# Patient Record
Sex: FEMALE | Race: WHITE | NOT HISPANIC OR LATINO | URBAN - METROPOLITAN AREA
[De-identification: names, ages, dates, MRNs, and addresses within clinical notes are randomized per-mention and may not be internally consistent; named-entity substitution may affect disease eponyms.]

---

## 2018-08-15 PROBLEM — Z00.00 ENCOUNTER FOR PREVENTIVE HEALTH EXAMINATION: Status: ACTIVE | Noted: 2018-08-15

## 2023-03-01 ENCOUNTER — EMERGENCY (EMERGENCY)
Facility: HOSPITAL | Age: 53
LOS: 1 days | Discharge: HOME | End: 2023-03-01
Attending: EMERGENCY MEDICINE | Admitting: EMERGENCY MEDICINE
Payer: COMMERCIAL

## 2023-03-01 VITALS
RESPIRATION RATE: 16 BRPM | OXYGEN SATURATION: 98 % | SYSTOLIC BLOOD PRESSURE: 170 MMHG | TEMPERATURE: 98 F | DIASTOLIC BLOOD PRESSURE: 80 MMHG | HEART RATE: 82 BPM

## 2023-03-01 VITALS
WEIGHT: 110.01 LBS | OXYGEN SATURATION: 98 % | SYSTOLIC BLOOD PRESSURE: 183 MMHG | TEMPERATURE: 97 F | DIASTOLIC BLOOD PRESSURE: 101 MMHG | HEART RATE: 79 BPM | RESPIRATION RATE: 18 BRPM

## 2023-03-01 LAB
ALBUMIN SERPL ELPH-MCNC: 3.8 G/DL — SIGNIFICANT CHANGE UP (ref 3.3–5)
ALP SERPL-CCNC: 132 U/L — HIGH (ref 40–120)
ALT FLD-CCNC: 120 U/L — HIGH (ref 12–78)
ANION GAP SERPL CALC-SCNC: 19 MMOL/L — HIGH (ref 5–17)
AST SERPL-CCNC: 173 U/L — HIGH (ref 15–37)
BASOPHILS # BLD AUTO: 0.04 K/UL — SIGNIFICANT CHANGE UP (ref 0–0.2)
BASOPHILS NFR BLD AUTO: 0.4 % — SIGNIFICANT CHANGE UP (ref 0–2)
BILIRUB SERPL-MCNC: 1.4 MG/DL — HIGH (ref 0.2–1.2)
BUN SERPL-MCNC: 11 MG/DL — SIGNIFICANT CHANGE UP (ref 7–23)
CALCIUM SERPL-MCNC: 8.9 MG/DL — SIGNIFICANT CHANGE UP (ref 8.5–10.1)
CHLORIDE SERPL-SCNC: 88 MMOL/L — LOW (ref 96–108)
CO2 SERPL-SCNC: 26 MMOL/L — SIGNIFICANT CHANGE UP (ref 22–31)
CREAT SERPL-MCNC: 0.59 MG/DL — SIGNIFICANT CHANGE UP (ref 0.5–1.3)
EGFR: 107 ML/MIN/1.73M2 — SIGNIFICANT CHANGE UP
EOSINOPHIL # BLD AUTO: 0.01 K/UL — SIGNIFICANT CHANGE UP (ref 0–0.5)
EOSINOPHIL NFR BLD AUTO: 0.1 % — SIGNIFICANT CHANGE UP (ref 0–6)
ETHANOL SERPL-MCNC: <10 MG/DL — SIGNIFICANT CHANGE UP (ref 0–10)
GLUCOSE SERPL-MCNC: 101 MG/DL — HIGH (ref 70–99)
HCT VFR BLD CALC: 42.6 % — SIGNIFICANT CHANGE UP (ref 34.5–45)
HGB BLD-MCNC: 15.4 G/DL — SIGNIFICANT CHANGE UP (ref 11.5–15.5)
IMM GRANULOCYTES NFR BLD AUTO: 0.5 % — SIGNIFICANT CHANGE UP (ref 0–0.9)
INR BLD: 1.03 RATIO — SIGNIFICANT CHANGE UP (ref 0.88–1.16)
LIDOCAIN IGE QN: 180 U/L — SIGNIFICANT CHANGE UP (ref 73–393)
LYMPHOCYTES # BLD AUTO: 1.47 K/UL — SIGNIFICANT CHANGE UP (ref 1–3.3)
LYMPHOCYTES # BLD AUTO: 13.5 % — SIGNIFICANT CHANGE UP (ref 13–44)
MCHC RBC-ENTMCNC: 31.3 PG — SIGNIFICANT CHANGE UP (ref 27–34)
MCHC RBC-ENTMCNC: 36.2 GM/DL — HIGH (ref 32–36)
MCV RBC AUTO: 86.6 FL — SIGNIFICANT CHANGE UP (ref 80–100)
MONOCYTES # BLD AUTO: 0.85 K/UL — SIGNIFICANT CHANGE UP (ref 0–0.9)
MONOCYTES NFR BLD AUTO: 7.8 % — SIGNIFICANT CHANGE UP (ref 2–14)
NEUTROPHILS # BLD AUTO: 8.49 K/UL — HIGH (ref 1.8–7.4)
NEUTROPHILS NFR BLD AUTO: 77.7 % — HIGH (ref 43–77)
NRBC # BLD: 0 /100 WBCS — SIGNIFICANT CHANGE UP (ref 0–0)
PLATELET # BLD AUTO: 257 K/UL — SIGNIFICANT CHANGE UP (ref 150–400)
POTASSIUM SERPL-MCNC: 3 MMOL/L — LOW (ref 3.5–5.3)
POTASSIUM SERPL-SCNC: 3 MMOL/L — LOW (ref 3.5–5.3)
PROT SERPL-MCNC: 8.1 G/DL — SIGNIFICANT CHANGE UP (ref 6–8.3)
PROTHROM AB SERPL-ACNC: 12.1 SEC — SIGNIFICANT CHANGE UP (ref 10.5–13.4)
RBC # BLD: 4.92 M/UL — SIGNIFICANT CHANGE UP (ref 3.8–5.2)
RBC # FLD: 12.2 % — SIGNIFICANT CHANGE UP (ref 10.3–14.5)
SODIUM SERPL-SCNC: 133 MMOL/L — LOW (ref 135–145)
WBC # BLD: 10.91 K/UL — HIGH (ref 3.8–10.5)
WBC # FLD AUTO: 10.91 K/UL — HIGH (ref 3.8–10.5)

## 2023-03-01 PROCEDURE — 80307 DRUG TEST PRSMV CHEM ANLYZR: CPT

## 2023-03-01 PROCEDURE — 80053 COMPREHEN METABOLIC PANEL: CPT

## 2023-03-01 PROCEDURE — 96376 TX/PRO/DX INJ SAME DRUG ADON: CPT

## 2023-03-01 PROCEDURE — 76705 ECHO EXAM OF ABDOMEN: CPT

## 2023-03-01 PROCEDURE — 36415 COLL VENOUS BLD VENIPUNCTURE: CPT

## 2023-03-01 PROCEDURE — 99285 EMERGENCY DEPT VISIT HI MDM: CPT

## 2023-03-01 PROCEDURE — 96374 THER/PROPH/DIAG INJ IV PUSH: CPT

## 2023-03-01 PROCEDURE — 99285 EMERGENCY DEPT VISIT HI MDM: CPT | Mod: 25

## 2023-03-01 PROCEDURE — 93010 ELECTROCARDIOGRAM REPORT: CPT

## 2023-03-01 PROCEDURE — 96375 TX/PRO/DX INJ NEW DRUG ADDON: CPT

## 2023-03-01 PROCEDURE — 85610 PROTHROMBIN TIME: CPT

## 2023-03-01 PROCEDURE — 76705 ECHO EXAM OF ABDOMEN: CPT | Mod: 26

## 2023-03-01 PROCEDURE — 85025 COMPLETE CBC W/AUTO DIFF WBC: CPT

## 2023-03-01 PROCEDURE — 93005 ELECTROCARDIOGRAM TRACING: CPT

## 2023-03-01 PROCEDURE — 83690 ASSAY OF LIPASE: CPT

## 2023-03-01 RX ORDER — ONDANSETRON 8 MG/1
4 TABLET, FILM COATED ORAL ONCE
Refills: 0 | Status: COMPLETED | OUTPATIENT
Start: 2023-03-01 | End: 2023-03-01

## 2023-03-01 RX ORDER — POTASSIUM CHLORIDE 20 MEQ
10 PACKET (EA) ORAL ONCE
Refills: 0 | Status: DISCONTINUED | OUTPATIENT
Start: 2023-03-01 | End: 2023-03-01

## 2023-03-01 RX ORDER — SODIUM CHLORIDE 9 MG/ML
1000 INJECTION INTRAMUSCULAR; INTRAVENOUS; SUBCUTANEOUS ONCE
Refills: 0 | Status: COMPLETED | OUTPATIENT
Start: 2023-03-01 | End: 2023-03-01

## 2023-03-01 RX ORDER — POTASSIUM CHLORIDE 20 MEQ
10 PACKET (EA) ORAL
Refills: 0 | Status: DISCONTINUED | OUTPATIENT
Start: 2023-03-01 | End: 2023-03-04

## 2023-03-01 RX ORDER — POTASSIUM CHLORIDE 20 MEQ
40 PACKET (EA) ORAL ONCE
Refills: 0 | Status: COMPLETED | OUTPATIENT
Start: 2023-03-01 | End: 2023-03-01

## 2023-03-01 RX ADMIN — SODIUM CHLORIDE 1000 MILLILITER(S): 9 INJECTION INTRAMUSCULAR; INTRAVENOUS; SUBCUTANEOUS at 11:10

## 2023-03-01 RX ADMIN — Medication 1 MILLIGRAM(S): at 11:10

## 2023-03-01 RX ADMIN — ONDANSETRON 4 MILLIGRAM(S): 8 TABLET, FILM COATED ORAL at 13:38

## 2023-03-01 RX ADMIN — Medication 100 MILLIEQUIVALENT(S): at 13:38

## 2023-03-01 RX ADMIN — ONDANSETRON 4 MILLIGRAM(S): 8 TABLET, FILM COATED ORAL at 11:10

## 2023-03-01 RX ADMIN — Medication 1 MILLIGRAM(S): at 13:39

## 2023-03-01 RX ADMIN — Medication 40 MILLIEQUIVALENT(S): at 13:37

## 2023-03-01 NOTE — ED PROVIDER NOTE - NSFOLLOWUPINSTRUCTIONS_ED_ALL_ED_FT
-- You should update your primary care physician on your Emergency Department visit and follow up with them.  If you do not have a physician or have difficulty following up, please call: [phone] to obtain a [facility] doctor or specialist who can provide follow up.    -- Return to the ER for worsening or persistent symptoms, and/or ANY NEW OR CONCERNING SYMPTOMS.    -- Take Librium as prescribed    -- Follow up with your primary care doctor and Dr. Manuel Hogue (substance abuse specialist)

## 2023-03-01 NOTE — ED PROVIDER NOTE - OBJECTIVE STATEMENT
pt states that she has n/v/d now for the last 2 days and feeling very shaky, pt states that about 10 days ago she stopped taking her xanax and stopped drinking alcohol. pt wants to stop both and was not sure how to do it so she just stopped "cold turkey".

## 2023-03-01 NOTE — ED PROVIDER NOTE - CLINICAL SUMMARY MEDICAL DECISION MAKING FREE TEXT BOX
Justin: pt with benzo/alcohol withdrawal will start on librium taper and have pt follow up with substance abuse specialist

## 2023-03-01 NOTE — ED PROVIDER NOTE - CARE PROVIDER_API CALL
Manuel Hogue)  Internal Medicine  [address] [place] [address]  Phone: [phone]  Fax: [phone]  Follow Up Time:

## 2023-03-01 NOTE — CONSULT NOTE ADULT - ASSESSMENT
nausea/vomiting  abdominal pain  transaminitis  EtOH dependence    US noted  LFTs noted  Anti-emetics prn  Pain control  Pt admits to EtOH dependence  Follow up blood alcohol  May need admission for detox; pending labs  Will follow    I reviewed the overnight course of events on the unit, re-confirming the patient history. I discussed the care with the patient and their family  Differential diagnosis and plan of care discussed with patient after the evaluation  35 minutes spent on total encounter of which more than fifty percent of the encounter was spent counseling and/or coordinating care by the attending physician.  Advanced care planning was discussed with patient and family.  Advanced care planning forms were reviewed and discussed.  Risks, benefits and alternatives of gastroenterologic procedures were discussed in detail and all questions were answered. nausea/vomiting  abdominal pain  transaminitis  EtOH dependence    US noted  LFTs noted  Anti-emetics prn  Pain control  Pt admits to EtOH dependence  Follow up blood alcohol  Monitor for withdrawal  May need admission for detox; pending labs  Will follow    I reviewed the overnight course of events on the unit, re-confirming the patient history. I discussed the care with the patient and their family  Differential diagnosis and plan of care discussed with patient after the evaluation  35 minutes spent on total encounter of which more than fifty percent of the encounter was spent counseling and/or coordinating care by the attending physician.  Advanced care planning was discussed with patient and family.  Advanced care planning forms were reviewed and discussed.  Risks, benefits and alternatives of gastroenterologic procedures were discussed in detail and all questions were answered. nausea/vomiting  abdominal pain  transaminitis  EtOH dependence    US noted  LFTs noted  Anti-emetics prn  Pain control  Pt admits to EtOH dependence  suspected alcoholic fatty liver disease  etoh cessation discussed  repeat lfts with pcp  d/w patient and family    I reviewed the overnight course of events on the unit, re-confirming the patient history. I discussed the care with the patient and their family  Differential diagnosis and plan of care discussed with patient after the evaluation  35 minutes spent on total encounter of which more than fifty percent of the encounter was spent counseling and/or coordinating care by the attending physician.  Advanced care planning was discussed with patient and family.  Advanced care planning forms were reviewed and discussed.  Risks, benefits and alternatives of gastroenterologic procedures were discussed in detail and all questions were answered.

## 2023-03-01 NOTE — ED PROVIDER NOTE - CARE PLAN
Referral made to [facility] for 1st time breast feeding mother.    1 Principal Discharge DX:	Symptom of drug withdrawal

## 2023-03-01 NOTE — ED ADULT NURSE NOTE - NSIMPLEMENTINTERV_GEN_ALL_ED
Left message to call back.    Implemented All Universal Safety Interventions:  [place] to call system. Call bell, personal items and telephone within reach. Instruct patient to call for assistance. Room bathroom lighting operational. Non-slip footwear when patient is off stretcher. Physically safe environment: no spills, clutter or unnecessary equipment. Stretcher in lowest position, wheels locked, appropriate side rails in place.

## 2023-03-01 NOTE — ED PROVIDER NOTE - CARE PROVIDERS DIRECT ADDRESSES
[email or web address]

## 2023-03-01 NOTE — CONSULT NOTE ADULT - SUBJECTIVE AND OBJECTIVE BOX
[facility]  Alexandro Parra PA-C  [address] [place] [id number] [phone]      Chief Complaint:  Patient is a 52y old  Female who presents with a chief complaint of nausea/vomiting/diarrhea    HPI:  51 yo pt seen in emergency department with daughter at bedside. She reports PMHx of EtOH dependence. Pt's daughter states pt has been having nausea/vomiting/diarrhea on and off for several months. They state pt previously was dependent on EtOH, and daughter states pt has recently relapsed. Pt claims she currently drinks a pint of vodka about 4 times per week, and she states last alcoholic drink was last Friday. Per ER staff pt is on xanax but stopped it abruptly. Pt currently still has nausea/vomiting/diarrhea, and reports she noticed some dark colored vomiting and diarrhea. Otherwise denies further GI complaints.    Allergies:  No Known Allergies      Medications:  potassium chloride  10 mEq/100 mL IVPB 10 milliEquivalent(s) IV Intermittent every 1 hour      PMHX/PSHX:    EtOH dependence    Social History:   Pt states currently drinks a pint of vodka 4 times per week    ROS:   General:  No fevers, chills, night sweats, fatigue,   Eyes:  Good vision, no reported pain  ENT:  No sore throat, pain, runny nose, dysphagia  CV:  No pain, palpitations, hypo/hypertension  Resp:  No dyspnea, cough, tachypnea, wheezing  GI:  +pain, +nausea, +vomiting, +diarrhea, No constipation, No weight loss, No fever, No pruritis, No rectal bleeding, No tarry stools, No dysphagia,  :  No pain, bleeding, incontinence, nocturia  Muscle:  No pain, weakness  Neuro:  No weakness, tingling, memory problems  Psych:  No fatigue, insomnia, mood problems, depression  Endocrine:  No polyuria, polydipsia, cold/heat intolerance  Heme:  No petechiae, ecchymosis, easy bruisability  Skin:  No rash, tattoos, scars, edema      PHYSICAL EXAM:   Vital Signs:  Vital Signs Last 24 Hrs  T(C): 36.3 (01 Mar 2023 10:00), Max: 36.3 (01 Mar 2023 10:00)  T(F): 97.3 (01 Mar 2023 10:00), Max: 97.3 (01 Mar 2023 10:00)  HR: 85 (01 Mar 2023 13:25) (79 - 85)  BP: 171/98 (01 Mar 2023 13:25) (171/98 - 183/101)  BP(mean): --  RR: 18 (01 Mar 2023 13:25) (18 - 18)  SpO2: 99% (01 Mar 2023 13:25) (98% - 99%)    Parameters below as of 01 Mar 2023 10:00  Patient On (Oxygen Delivery Method): room air    GENERAL:  Appears stated age  HEENT:  NC/AT  CHEST:  Full & symmetric excursion  HEART:  Regular rhythm  ABDOMEN:  Soft, +diffusely tender, non-distended  EXTREMITIES:  No cyanosis, clubbing or edema  SKIN:  No rash  NEURO:  Alert    LABS:                        15.4   10.91 )-----------( 257      ( 01 Mar 2023 11:00 )             42.6     03-01    133<L>  |  88<L>  |  11  ----------------------------<  101<H>  3.0<L>   |  26  |  0.59    Ca    8.9      01 Mar 2023 11:00    TPro  8.1  /  Alb  3.8  /  TBili  1.4<H>  /  DBili  x   /  AST  173<H>  /  ALT  120<H>  /  AlkPhos  132<H>  03-01    LIVER FUNCTIONS - ( 01 Mar 2023 11:00 )  Alb: 3.8 g/dL / Pro: 8.1 g/dL / ALK PHOS: 132 U/L / ALT: 120 U/L / AST: 173 U/L / GGT: x               Lipase [id number]         Imaging:  < from: US Abdomen Upper Quadrant Right (03.01.23 @ 13:06) >    ACC: [id number] EXAM:  US ABDOMEN RT UPR QUADRANT   ORDERED BY: XU MILAN     PROCEDURE DATE:  03/01/2023          INTERPRETATION:  CLINICAL INFORMATION: Right upper quadrant abdominal   pain.    COMPARISON: None available.    TECHNIQUE: Sonography of the right upper quadrant.    FINDINGS:  Liver: Increased echogenicity.  Bile ducts: Normal caliber. Common bile duct measures 4 mm.  Gallbladder: Within normal limits. No cholelithiasis, wall thickening or   pericholecystic fluid.  Pancreas: Visualized portions are within normal limits.  Right kidney: 10.1 cm. No hydronephrosis.  Ascites: None.  IVC: Visualized portions are within normal limits.    IMPRESSION:  Hepatic steatosis.    --- End of Report ---      GENE BUTLER MD; Attending Radiologist  This document has been electronically signed. Mar  1 2023  1:15PM    < end of copied text >

## 2023-03-01 NOTE — ED PROVIDER NOTE - PATIENT PORTAL LINK FT
You can access the FollowMyHealth Patient Portal offered by [facility] by registering at the following website: [email or web address]. By joining [facility]’s FollowMyHealth portal, you will also be able to view your health information using other applications (apps) compatible with our system.

## 2023-03-08 DIAGNOSIS — F32.A DEPRESSION, UNSPECIFIED: ICD-10-CM

## 2023-03-08 DIAGNOSIS — Z78.9 OTHER SPECIFIED HEALTH STATUS: ICD-10-CM

## 2023-03-08 DIAGNOSIS — Z82.3 FAMILY HISTORY OF STROKE: ICD-10-CM

## 2023-03-08 DIAGNOSIS — F17.210 NICOTINE DEPENDENCE, CIGARETTES, UNCOMPLICATED: ICD-10-CM

## 2023-03-08 DIAGNOSIS — Z82.49 FAMILY HISTORY OF ISCHEMIC HEART DISEASE AND OTHER DISEASES OF THE CIRCULATORY SYSTEM: ICD-10-CM

## 2023-03-08 DIAGNOSIS — F41.9 ANXIETY DISORDER, UNSPECIFIED: ICD-10-CM

## 2023-03-08 DIAGNOSIS — Z80.8 FAMILY HISTORY OF MALIGNANT NEOPLASM OF OTHER ORGANS OR SYSTEMS: ICD-10-CM

## 2023-03-08 RX ORDER — ARIPIPRAZOLE 2 MG/1
2 TABLET ORAL
Refills: 0 | Status: ACTIVE | COMMUNITY

## 2023-03-09 ENCOUNTER — NON-APPOINTMENT (OUTPATIENT)
Age: 53
End: 2023-03-09

## 2023-03-09 ENCOUNTER — APPOINTMENT (OUTPATIENT)
Dept: CARDIOLOGY | Facility: CLINIC | Age: 53
End: 2023-03-09
Payer: COMMERCIAL

## 2023-03-09 VITALS
BODY MASS INDEX: 24.77 KG/M2 | HEIGHT: 58 IN | OXYGEN SATURATION: 98 % | SYSTOLIC BLOOD PRESSURE: 160 MMHG | WEIGHT: 118 LBS | HEART RATE: 79 BPM | DIASTOLIC BLOOD PRESSURE: 100 MMHG

## 2023-03-09 DIAGNOSIS — R55 SYNCOPE AND COLLAPSE: ICD-10-CM

## 2023-03-09 DIAGNOSIS — R07.89 OTHER CHEST PAIN: ICD-10-CM

## 2023-03-09 DIAGNOSIS — I10 ESSENTIAL (PRIMARY) HYPERTENSION: ICD-10-CM

## 2023-03-09 PROCEDURE — 93000 ELECTROCARDIOGRAM COMPLETE: CPT

## 2023-03-09 PROCEDURE — 99244 OFF/OP CNSLTJ NEW/EST MOD 40: CPT

## 2023-03-09 RX ORDER — SERTRALINE HYDROCHLORIDE 100 MG/1
100 TABLET, FILM COATED ORAL DAILY
Qty: 180 | Refills: 3 | Status: ACTIVE | COMMUNITY

## 2023-03-09 RX ORDER — ALPRAZOLAM 2 MG/1
TABLET ORAL
Refills: 0 | Status: ACTIVE | COMMUNITY

## 2023-03-09 NOTE — HISTORY OF PRESENT ILLNESS
[FreeTextEntry1] : Felicia presented to the office today for a cardiovascular evaluation.  She presents for the further evaluation of syncope and chest discomfort.\par \par She is now 52 years old, with a history of hypertension.  She does not have diabetes.  In the past, she was on cholesterol medication, but reportedly her cholesterol improved and she is no longer taking medication for that.  She has a history of smoking, at times up to 2 packs/day, presently one half a pack a day.  She is not known to have any underlying structural heart disease.  She does have a history of breast cancer status postresection, following which she had chemotherapy and 5 years of tamoxifen.  There is a family history of aggressive atherosclerosis, noting that her father had a myocardial infarction at the age of 42, with 2 subsequent open heart surgeries.  Her brother had a myocardial infarction at a young age, and presently has a defibrillator.\par \par At baseline, she does not exercise, but tries to stay physically active.  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  She does report chest discomfort which seems to be quite brief, and without definite connection to physical activity or emotional stress.  She describes an occasional left-sided chest discomfort, and an occasional midsternal discomfort without reproducible radiation or associated symptoms.  This is a very brief discomfort, lasting moments.  She denies orthopnea, PND and lower extremity edema.  She denies palpitations, dizziness and prior episodes of syncope.\par \par She has been in her usual state of health generally speaking until recently.  She had significant nausea vomiting and dehydration given some confusion with her medications.  She says that she had minimal p.o. intake for about 1 week.  In that context, she reports that on a single day recently, she got up out of bed or from a chair several times, felt lightheaded, and fell to the floor with brief losses of consciousness.  These were not preceded by any sensation of palpitations, and there was no prolonged postictal state.  It sounds like this was all in the context of her having stopped her Xanax, with some associated withdrawal symptoms.  She was recently evaluated by her primary care physician, and given concerns about her overall risk factors and these episodes of syncope, she was referred here for evaluation.

## 2023-03-09 NOTE — DISCUSSION/SUMMARY
[FreeTextEntry1] : Her blood pressure is elevated, and does not meaningfully improve by the conclusion of the examination.  She has multiple risk factors for atherosclerosis, presents with multiple syncopal episodes in the setting of dehydration.  As well as chest discomfort of uncertain etiology.\par \par For her syncope, which was likely related to orthostasis in the setting of dehydration, I have suggested a precautionary echocardiogram.  Noting her blood pressure, I have requested that she start checking her blood pressure at home twice daily.  She will come to the office in about 2 weeks for a blood pressure check with our nurses, with additional medications changed or added on an as-needed basis at that time.  When her blood pressure is sufficiently controlled, I have suggested exercise nuclear stress testing.  I will be in contact with her to discuss the results.  To make sure that her blood pressure and her overall risk factors are better controlled, I have suggested a follow-up in about 3 months.  I have requested her most recent blood work results for my review, including her lipid profile.

## 2023-03-14 RX ORDER — LOSARTAN POTASSIUM 100 MG/1
TABLET, FILM COATED ORAL
Refills: 0 | Status: DISCONTINUED | COMMUNITY
End: 2023-03-14

## 2023-03-14 RX ORDER — VALSARTAN 160 MG/1
160 TABLET, COATED ORAL
Refills: 0 | Status: ACTIVE | COMMUNITY

## 2023-03-14 RX ORDER — METOPROLOL SUCCINATE 100 MG/1
100 TABLET, EXTENDED RELEASE ORAL
Refills: 0 | Status: ACTIVE | COMMUNITY

## 2023-04-03 ENCOUNTER — APPOINTMENT (OUTPATIENT)
Dept: CARDIOLOGY | Facility: CLINIC | Age: 53
End: 2023-04-03

## 2023-05-25 ENCOUNTER — INPATIENT (INPATIENT)
Facility: HOSPITAL | Age: 53
LOS: 1 days | Discharge: HOME | DRG: 439 | End: 2023-05-27
Attending: FAMILY MEDICINE | Admitting: INTERNAL MEDICINE
Payer: COMMERCIAL

## 2023-05-25 VITALS
RESPIRATION RATE: 19 BRPM | DIASTOLIC BLOOD PRESSURE: 59 MMHG | HEIGHT: 56 IN | OXYGEN SATURATION: 99 % | SYSTOLIC BLOOD PRESSURE: 89 MMHG | TEMPERATURE: 98 F | WEIGHT: 104.94 LBS | HEART RATE: 75 BPM

## 2023-05-25 DIAGNOSIS — I95.9 HYPOTENSION, UNSPECIFIED: ICD-10-CM

## 2023-05-25 DIAGNOSIS — F41.9 ANXIETY DISORDER, UNSPECIFIED: ICD-10-CM

## 2023-05-25 DIAGNOSIS — Z29.9 ENCOUNTER FOR PROPHYLACTIC MEASURES, UNSPECIFIED: ICD-10-CM

## 2023-05-25 DIAGNOSIS — K85.90 ACUTE PANCREATITIS WITHOUT NECROSIS OR INFECTION, UNSPECIFIED: ICD-10-CM

## 2023-05-25 DIAGNOSIS — E87.6 HYPOKALEMIA: ICD-10-CM

## 2023-05-25 DIAGNOSIS — E86.0 DEHYDRATION: ICD-10-CM

## 2023-05-25 DIAGNOSIS — E87.8 OTHER DISORDERS OF ELECTROLYTE AND FLUID BALANCE, NOT ELSEWHERE CLASSIFIED: ICD-10-CM

## 2023-05-25 DIAGNOSIS — R94.31 ABNORMAL ELECTROCARDIOGRAM [ECG] [EKG]: ICD-10-CM

## 2023-05-25 DIAGNOSIS — F10.20 ALCOHOL DEPENDENCE, UNCOMPLICATED: ICD-10-CM

## 2023-05-25 DIAGNOSIS — N17.9 ACUTE KIDNEY FAILURE, UNSPECIFIED: ICD-10-CM

## 2023-05-25 LAB
ALBUMIN SERPL ELPH-MCNC: 3.4 G/DL — SIGNIFICANT CHANGE UP (ref 3.3–5)
ALP SERPL-CCNC: 88 U/L — SIGNIFICANT CHANGE UP (ref 40–120)
ALT FLD-CCNC: 56 U/L — SIGNIFICANT CHANGE UP (ref 12–78)
AMYLASE P1 CFR SERPL: 228 U/L — HIGH (ref 25–125)
ANION GAP SERPL CALC-SCNC: 12 MMOL/L — SIGNIFICANT CHANGE UP (ref 5–17)
APPEARANCE UR: CLEAR — SIGNIFICANT CHANGE UP
AST SERPL-CCNC: 44 U/L — HIGH (ref 15–37)
BASOPHILS # BLD AUTO: 0.03 K/UL — SIGNIFICANT CHANGE UP (ref 0–0.2)
BASOPHILS NFR BLD AUTO: 0.3 % — SIGNIFICANT CHANGE UP (ref 0–2)
BILIRUB SERPL-MCNC: 0.5 MG/DL — SIGNIFICANT CHANGE UP (ref 0.2–1.2)
BILIRUB UR-MCNC: NEGATIVE — SIGNIFICANT CHANGE UP
BUN SERPL-MCNC: 51 MG/DL — HIGH (ref 7–23)
CALCIUM SERPL-MCNC: 9.2 MG/DL — SIGNIFICANT CHANGE UP (ref 8.5–10.1)
CHLORIDE SERPL-SCNC: 87 MMOL/L — LOW (ref 96–108)
CO2 SERPL-SCNC: 32 MMOL/L — HIGH (ref 22–31)
COLOR SPEC: SIGNIFICANT CHANGE UP
CREAT SERPL-MCNC: 1.4 MG/DL — HIGH (ref 0.5–1.3)
DIFF PNL FLD: NEGATIVE — SIGNIFICANT CHANGE UP
EGFR: 45 ML/MIN/1.73M2 — LOW
EOSINOPHIL # BLD AUTO: 0.07 K/UL — SIGNIFICANT CHANGE UP (ref 0–0.5)
EOSINOPHIL NFR BLD AUTO: 0.6 % — SIGNIFICANT CHANGE UP (ref 0–6)
EPI CELLS # UR: SIGNIFICANT CHANGE UP
ETHANOL SERPL-MCNC: <10 MG/DL — SIGNIFICANT CHANGE UP (ref 0–10)
FLUAV AG NPH QL: SIGNIFICANT CHANGE UP
FLUBV AG NPH QL: SIGNIFICANT CHANGE UP
GLUCOSE SERPL-MCNC: 97 MG/DL — SIGNIFICANT CHANGE UP (ref 70–99)
GLUCOSE UR QL: 50 MG/DL
HCT VFR BLD CALC: 38.4 % — SIGNIFICANT CHANGE UP (ref 34.5–45)
HGB BLD-MCNC: 13.9 G/DL — SIGNIFICANT CHANGE UP (ref 11.5–15.5)
IMM GRANULOCYTES NFR BLD AUTO: 0.6 % — SIGNIFICANT CHANGE UP (ref 0–0.9)
KETONES UR-MCNC: NEGATIVE — SIGNIFICANT CHANGE UP
LEUKOCYTE ESTERASE UR-ACNC: ABNORMAL
LIDOCAIN IGE QN: 3190 U/L — HIGH (ref 73–393)
LYMPHOCYTES # BLD AUTO: 2.47 K/UL — SIGNIFICANT CHANGE UP (ref 1–3.3)
LYMPHOCYTES # BLD AUTO: 21.2 % — SIGNIFICANT CHANGE UP (ref 13–44)
MAGNESIUM SERPL-MCNC: 1.8 MG/DL — SIGNIFICANT CHANGE UP (ref 1.6–2.6)
MCHC RBC-ENTMCNC: 32.6 PG — SIGNIFICANT CHANGE UP (ref 27–34)
MCHC RBC-ENTMCNC: 36.2 GM/DL — HIGH (ref 32–36)
MCV RBC AUTO: 90.1 FL — SIGNIFICANT CHANGE UP (ref 80–100)
MONOCYTES # BLD AUTO: 1.07 K/UL — HIGH (ref 0–0.9)
MONOCYTES NFR BLD AUTO: 9.2 % — SIGNIFICANT CHANGE UP (ref 2–14)
NEUTROPHILS # BLD AUTO: 7.92 K/UL — HIGH (ref 1.8–7.4)
NEUTROPHILS NFR BLD AUTO: 68.1 % — SIGNIFICANT CHANGE UP (ref 43–77)
NITRITE UR-MCNC: NEGATIVE — SIGNIFICANT CHANGE UP
NRBC # BLD: 0 /100 WBCS — SIGNIFICANT CHANGE UP (ref 0–0)
PH UR: 7 — SIGNIFICANT CHANGE UP (ref 5–8)
PLATELET # BLD AUTO: 237 K/UL — SIGNIFICANT CHANGE UP (ref 150–400)
POTASSIUM SERPL-MCNC: 2.3 MMOL/L — CRITICAL LOW (ref 3.5–5.3)
POTASSIUM SERPL-SCNC: 2.3 MMOL/L — CRITICAL LOW (ref 3.5–5.3)
PROT SERPL-MCNC: 6.7 G/DL — SIGNIFICANT CHANGE UP (ref 6–8.3)
PROT UR-MCNC: NEGATIVE — SIGNIFICANT CHANGE UP
RBC # BLD: 4.26 M/UL — SIGNIFICANT CHANGE UP (ref 3.8–5.2)
RBC # FLD: 11.7 % — SIGNIFICANT CHANGE UP (ref 10.3–14.5)
RBC CASTS # UR COMP ASSIST: SIGNIFICANT CHANGE UP /HPF (ref 0–4)
RSV RNA NPH QL NAA+NON-PROBE: SIGNIFICANT CHANGE UP
SARS-COV-2 RNA SPEC QL NAA+PROBE: SIGNIFICANT CHANGE UP
SODIUM SERPL-SCNC: 131 MMOL/L — LOW (ref 135–145)
SP GR SPEC: 1 — LOW (ref 1.01–1.02)
TROPONIN I, HIGH SENSITIVITY RESULT: 30.6 NG/L — SIGNIFICANT CHANGE UP
UROBILINOGEN FLD QL: NEGATIVE — SIGNIFICANT CHANGE UP
WBC # BLD: 11.63 K/UL — HIGH (ref 3.8–10.5)
WBC # FLD AUTO: 11.63 K/UL — HIGH (ref 3.8–10.5)
WBC UR QL: SIGNIFICANT CHANGE UP

## 2023-05-25 PROCEDURE — 74177 CT ABD & PELVIS W/CONTRAST: CPT | Mod: 26

## 2023-05-25 PROCEDURE — 71045 X-RAY EXAM CHEST 1 VIEW: CPT | Mod: 26

## 2023-05-25 PROCEDURE — 99285 EMERGENCY DEPT VISIT HI MDM: CPT

## 2023-05-25 PROCEDURE — 93010 ELECTROCARDIOGRAM REPORT: CPT

## 2023-05-25 RX ORDER — ALPRAZOLAM 0.25 MG
1 TABLET ORAL
Refills: 0 | DISCHARGE

## 2023-05-25 RX ORDER — ARIPIPRAZOLE 15 MG/1
5 TABLET ORAL DAILY
Refills: 0 | Status: DISCONTINUED | OUTPATIENT
Start: 2023-05-25 | End: 2023-05-27

## 2023-05-25 RX ORDER — SERTRALINE 25 MG/1
100 TABLET, FILM COATED ORAL
Refills: 0 | Status: DISCONTINUED | OUTPATIENT
Start: 2023-05-25 | End: 2023-05-27

## 2023-05-25 RX ORDER — ARIPIPRAZOLE 15 MG/1
1 TABLET ORAL
Refills: 0 | DISCHARGE

## 2023-05-25 RX ORDER — POTASSIUM CHLORIDE 20 MEQ
40 PACKET (EA) ORAL ONCE
Refills: 0 | Status: COMPLETED | OUTPATIENT
Start: 2023-05-25 | End: 2023-05-25

## 2023-05-25 RX ORDER — METOPROLOL TARTRATE 50 MG
1 TABLET ORAL
Refills: 0 | DISCHARGE

## 2023-05-25 RX ORDER — POTASSIUM CHLORIDE 20 MEQ
10 PACKET (EA) ORAL
Refills: 0 | Status: COMPLETED | OUTPATIENT
Start: 2023-05-25 | End: 2023-05-25

## 2023-05-25 RX ORDER — HEPARIN SODIUM 5000 [USP'U]/ML
5000 INJECTION INTRAVENOUS; SUBCUTANEOUS EVERY 8 HOURS
Refills: 0 | Status: DISCONTINUED | OUTPATIENT
Start: 2023-05-25 | End: 2023-05-26

## 2023-05-25 RX ORDER — SODIUM CHLORIDE 9 MG/ML
2000 INJECTION INTRAMUSCULAR; INTRAVENOUS; SUBCUTANEOUS ONCE
Refills: 0 | Status: COMPLETED | OUTPATIENT
Start: 2023-05-25 | End: 2023-05-25

## 2023-05-25 RX ORDER — SODIUM CHLORIDE 9 MG/ML
1000 INJECTION, SOLUTION INTRAVENOUS
Refills: 0 | Status: DISCONTINUED | OUTPATIENT
Start: 2023-05-25 | End: 2023-05-25

## 2023-05-25 RX ORDER — SODIUM CHLORIDE 9 MG/ML
1000 INJECTION, SOLUTION INTRAVENOUS
Refills: 0 | Status: DISCONTINUED | OUTPATIENT
Start: 2023-05-25 | End: 2023-05-26

## 2023-05-25 RX ORDER — ALPRAZOLAM 0.25 MG
0.5 TABLET ORAL THREE TIMES A DAY
Refills: 0 | Status: DISCONTINUED | OUTPATIENT
Start: 2023-05-25 | End: 2023-05-27

## 2023-05-25 RX ORDER — THIAMINE MONONITRATE (VIT B1) 100 MG
100 TABLET ORAL DAILY
Refills: 0 | Status: DISCONTINUED | OUTPATIENT
Start: 2023-05-25 | End: 2023-05-27

## 2023-05-25 RX ORDER — SERTRALINE 25 MG/1
1 TABLET, FILM COATED ORAL
Refills: 0 | DISCHARGE

## 2023-05-25 RX ORDER — BENZOCAINE AND MENTHOL 5; 1 G/100ML; G/100ML
1 LIQUID ORAL ONCE
Refills: 0 | Status: COMPLETED | OUTPATIENT
Start: 2023-05-25 | End: 2023-05-26

## 2023-05-25 RX ORDER — VALSARTAN 80 MG/1
1 TABLET ORAL
Refills: 0 | DISCHARGE

## 2023-05-25 RX ADMIN — Medication 100 MILLIEQUIVALENT(S): at 23:13

## 2023-05-25 RX ADMIN — SERTRALINE 100 MILLIGRAM(S): 25 TABLET, FILM COATED ORAL at 21:11

## 2023-05-25 RX ADMIN — ARIPIPRAZOLE 5 MILLIGRAM(S): 15 TABLET ORAL at 21:11

## 2023-05-25 RX ADMIN — Medication 40 MILLIEQUIVALENT(S): at 18:37

## 2023-05-25 RX ADMIN — Medication 40 MILLIEQUIVALENT(S): at 23:07

## 2023-05-25 RX ADMIN — Medication 0.5 MILLIGRAM(S): at 23:23

## 2023-05-25 RX ADMIN — SODIUM CHLORIDE 125 MILLILITER(S): 9 INJECTION, SOLUTION INTRAVENOUS at 23:12

## 2023-05-25 RX ADMIN — SODIUM CHLORIDE 2000 MILLILITER(S): 9 INJECTION INTRAMUSCULAR; INTRAVENOUS; SUBCUTANEOUS at 16:50

## 2023-05-25 RX ADMIN — HEPARIN SODIUM 5000 UNIT(S): 5000 INJECTION INTRAVENOUS; SUBCUTANEOUS at 21:34

## 2023-05-25 RX ADMIN — Medication 2 MILLIGRAM(S): at 21:00

## 2023-05-25 RX ADMIN — Medication 100 MILLIEQUIVALENT(S): at 18:20

## 2023-05-25 RX ADMIN — Medication 100 MILLIEQUIVALENT(S): at 20:01

## 2023-05-25 NOTE — H&P ADULT - PROBLEM SELECTOR PLAN 4
BP 88/56 on admission, s/p 2L NS bolus in ED  - Patient normally hypertensive. HOLD home meds in setting of hypotension  - IVF: LR @ 125cc/hr x 12 hrs, reassess need for fluids in AM  - Upgrade to DASH/TLC diet as tolerated  - Monitor routine hemodynamics

## 2023-05-25 NOTE — ED ADULT NURSE NOTE - OBJECTIVE STATEMENT
received pt stating she was sent in for a low potassium level.   pt statesaj has not been feelin gwell over the last several days and has had a poor appeteite.  pt admits to daily etoh (vodka) use but states she has been unable to drink for the last three days due to nausea and discmofort.    pt nora'd by pmd, safety maintained, no s/s of etoh withrdrawal noted. BN

## 2023-05-25 NOTE — ED ADULT NURSE NOTE - NSFALLHARMRISKINTERV_ED_ALL_ED

## 2023-05-25 NOTE — H&P ADULT - PROBLEM SELECTOR PLAN 8
Hx of ETOH withdrawal, currently consumes ETOH. Reports last drink was 5/22 as she has not been feeling well  - f/u alcohol level  - [facility] protocol

## 2023-05-25 NOTE — H&P ADULT - NSHPREVIEWOFSYSTEMS_GEN_ALL_CORE
REVIEW OF SYSTEMS:  CONSTITUTIONAL: +weakness, chills, fatigue, decreased appetite. No fever  HENMT: +sore throat. No HA, lightheadedness/dizziness  RESPIRATORY: No cough, wheezing, hemoptysis; No shortness of breath.  CARDIOVASCULAR: No chest pain, palpitations.  GASTROINTESTINAL: No abdominal or epigastric pain. No nausea or vomiting; No diarrhea or constipation.  GENITOURINARY: No dysuria, changes in frequency, hematuria, or incontinence  NEUROLOGICAL: +numbness of L fingertips. Baseline strength.   SKIN: No itching, rashes  MUSCULOSKELETAL: No joint pain or swelling; No muscle, back, or extremity pain

## 2023-05-25 NOTE — H&P ADULT - PROBLEM SELECTOR PLAN 2
Patient reports vomiting for several days as well as persisting weakness and malaise  - IVF: LR @ 125cc/hr x 12 hrs, reassess need for fluids in AM  - Diet: NPO except meds at this time, advance as tolerated

## 2023-05-25 NOTE — H&P ADULT - PROBLEM SELECTOR PLAN 5
BUN 51, Cr 1.40 on admission  - Per chart review, Cr 0.59 in 3/2023  - RONAK likely pre-renal azotemia 2/2 dehydration  - IVF: LR @ 125cc/hr x 12 hrs, reassess need for fluids in AM  - Avoid nephrotoxic meds  - Consider nephro consult if renal function worsens

## 2023-05-25 NOTE — H&P ADULT - PROBLEM SELECTOR PLAN 1
Na 131, K 2.3 on admission  - Likely 2/2 vomiting, severe dehydration  - IVF: LR @ 125cc/hr x 12 hrs, reassess need for fluids in AM  - Repleted K in ED with Klor-Con 40mEq x1, IV KCl 10 x4, monitor with serial BMP and replete PRN

## 2023-05-25 NOTE — H&P ADULT - HISTORY OF PRESENT ILLNESS
51yo F PMH ETOH dependence presents to ED for abnormal labs. PCP informed her that WBC was elevated, K is low, and she may have pancreatitis. Patient states that she has been sick for 1 week, initially with vomiting for several days; vomiting has resolved but patient complains of persisting weakness and malaise. Denies fever, recent travel, sick contacts. Of note, patient drinks vodka daily but has not consumed alcohol in 3 days because she has not felt well.    She went to see her PCP on Tuesday 5/23 who did some bloodwork; was told to go to the hospital for further medical care today.    In ED:  Vital signs: T 97.5F, HR 81, BP 88/56, RR 18, SpO2 99% on RA  Notable labs: WBC 11.63, Amylase 228, Na 131, K 2.3, BUN 51, Cr 1.40, AST 44, GFR 45, Lipase 3190  CXR: No acute cardiopulmonary disease process.  EKG: NSR @ 75bpm, prolonged QTc @ 553ms  Given: Klor-Con 40mEq x1, IV KCl 10 x 4, 2L NS bolus

## 2023-05-25 NOTE — H&P ADULT - PROBLEM SELECTOR PLAN 3
Lipase 3190, Amylase 228 on admission. s/p 2L NS bolus in ED  - IVF: LR @ 125 cc/hr x 12 hrs, reassess need for fluids in AM  - f/u CT A/P, RUQ US  - NPO except meds, advance diet pending clinical course  - Avoid morphine as can exacerbate spasms of Sphincter of Oddi  - f/u lipase, urine cultures, blood alcohol level, lipid panel  - CIWA protocol employed due to history of ETOH  - GI (Dr. Garnica) consulted, f/u recs

## 2023-05-25 NOTE — H&P ADULT - NSHPPHYSICALEXAM_GEN_ALL_CORE
T(C): 36.4 (05-25-23 @ 13:56), Max: 36.4 (05-25-23 @ 13:56)  HR: 91 (05-25-23 @ 18:37) (75 - 91)  BP: 118/73 (05-25-23 @ 18:37) (88/56 - 118/73)  RR: 18 (05-25-23 @ 18:37) (18 - 19)  SpO2: 97% (05-25-23 @ 18:37) (97% - 99%)    GENERAL: patient appears well, no acute distress, conversant  EYES: anicteric sclerae, no exudates  ENMT: oropharynx clear without erythema, no exudates, dry mucous membranes  LUNGS: clear to auscultation bilaterally, no rales or wheezing  HEART: S1/S2, regular rate and rhythm, no murmurs noted, no lower extremity edema  GASTROINTESTINAL: abdomen is soft, nontender, nondistended, normoactive bowel sounds, no palpable masses  INTEGUMENT: good skin turgor, warm skin, appears well perfused  MUSCULOSKELETAL: no clubbing or cyanosis, no obvious deformity  NEUROLOGIC: awake, alert, oriented x3, good muscle tone in 4 extremities, no obvious sensory deficits  PSYCHIATRIC: mood is good, affect is congruent, linear and logical thought process  HEME/LYMPH: no obvious ecchymosis or petechiae

## 2023-05-25 NOTE — H&P ADULT - ASSESSMENT
53yo F PMH ETOH dependence presents to ED for abnormal labs, admit for electrolyte abnormality and severe dehydration, elevated lipase requiring workup.

## 2023-05-25 NOTE — ED PROVIDER NOTE - CONSTITUTIONAL, MLM
normal... Well appearing, awake, alert, oriented to person, place, time/situation and in no apparent distress. hypotensive.

## 2023-05-25 NOTE — H&P ADULT - NSICDXFAMILYHX_GEN_ALL_CORE_FT
FAMILY HISTORY:  Father  Still living? Unknown  FHx: diabetes mellitus, Age at diagnosis: Age Unknown  FHx: myocardial infarction, Age at diagnosis: Age Unknown  FHx: stroke, Age at diagnosis: Age Unknown    Mother  Still living? Unknown  FHx: brain cancer, Age at diagnosis: Age Unknown

## 2023-05-25 NOTE — ED ADULT NURSE REASSESSMENT NOTE - NS ED NURSE REASSESS COMMENT FT1
Assumed care of Pt from previous RN, sent from MD for low potassium, 2nd K rider in progress, resting comfortably on stretcher, admitting resident at bedside evaluating Pt, will continue to monitor closely.

## 2023-05-25 NOTE — ED PROVIDER NOTE - OBJECTIVE STATEMENT
Patient came into the ED with her  sent by PCP for abnormal labs. Patient has been sick x 1 week. Patient came into the ED with her  sent by PCP for abnormal labs. Patient has been sick x 1 week. initially with vomiting x several days. now vomiting stopped but persistent weakness and not feeling well. labs drawn by Dr. Gamez (PCP) yesterday and was told her WBC are elevated and K is low and she may have pancreatitis. Patient came into the ED with her  sent by PCP for abnormal labs. Patient has been sick x 1 week. initially with vomiting x several days. now vomiting stopped but persistent weakness and not feeling well. labs drawn by Dr. Gamez (PCP) yesterday and was told her WBC are elevated and K is low and she may have pancreatitis. no fever. no travel. no sick contacts. Patient came into the ED with her  sent by PCP for abnormal labs. Patient has been sick x 1 week. initially with vomiting x several days. now vomiting stopped but persistent weakness and not feeling well. labs drawn by Dr. Gamez (PCP) yesterday and was told her WBC are elevated and K is low and she may have pancreatitis. no fever. no travel. no sick contacts. patient drinks vodka daily, but hasn't drank in 3 days secondary to not feeling well.

## 2023-05-25 NOTE — PATIENT PROFILE ADULT - NSTOBACCOCESSATIONEDU4_GEN_A_NUR
Plan: Resolved w/ zpak wynzora and IMK\\n\\nFrom previous notes: \\nDiscussed w/ MFF\\nPt previously had strep years ago and had a ?strep psoriasis?\\nPt denies sore throat or cold\\nWe will treat w/ Z-Low also today\\nPt would like IMK additionally\\nPt denies h/o DM, HTN or osteoporosis.
Detail Level: Detailed
Smoking even a single puff increases the likelihood of a full relapse, withdrawal symptoms peak within 1-2 weeks, but can persist for months

## 2023-05-25 NOTE — H&P ADULT - ATTENDING COMMENTS
53yo F PMH ETOH dependence presents to ED for abnormal labs, admit for electrolyte abnormality and severe dehydration, elevated lipase requiring workup.    Patient seen and examined     T(C): 36.7 (05-25-23 @ 23:02), Max: 36.7 (05-25-23 @ 23:02)  HR: 80 (05-25-23 @ 23:02) (75 - 91)  BP: 94/59 (05-25-23 @ 23:02) (88/56 - 120/72)  RR: 18 (05-25-23 @ 23:02) (18 - 19)  SpO2: 94% (05-25-23 @ 23:02) (94% - 99%)    Reviewed labs and imaging     Acute Pancreatitis   Alcohol Abuse   iv fluids, keep patient  NPO     GI comsult in AM     Alcohol Withdrawal Symptom trigger prn doses   iv Thiamine Folic Acid

## 2023-05-25 NOTE — ED PROVIDER NOTE - CARE PLAN
1 Principal Discharge DX:	Dehydration   Principal Discharge DX:	Hypokalemia  Secondary Diagnosis:	Dehydration  Secondary Diagnosis:	Hypotension  Secondary Diagnosis:	Pancreatitis

## 2023-05-25 NOTE — ED PROVIDER NOTE - PROGRESS NOTE DETAILS
results reviewed with patient. concern for severe hypokalemia with potassium 2.3 with hypotension, severe dehydration and lipase 3,100. for medical admission.

## 2023-05-25 NOTE — H&P ADULT - NSHPSOCIALHISTORY_GEN_ALL_CORE
Tobacco: Current smoker, 1 PPD for 25 years. Denied nicotine patch at this time  EtOH: Drinks vodka "a few times a week." Previously admitted for ETOH withdrawal requiring librium taper 3/2023   Recreational drug use: Denies  Lives with:  at home  Ambulates: unassisted   ADLs: able to cook, clean, and shop independently Tobacco: Current smoker, 1 PPD for 25 years. Denied nicotine patch at this time  EtOH: Drinks vodka "a few times a week."  Recreational drug use: Denies  Lives with:  at home  Ambulates: unassisted   ADLs: able to cook, clean, and shop independently

## 2023-05-26 ENCOUNTER — TRANSCRIPTION ENCOUNTER (OUTPATIENT)
Age: 53
End: 2023-05-26

## 2023-05-26 LAB
ALBUMIN SERPL ELPH-MCNC: 2.8 G/DL — LOW (ref 3.3–5)
ALP SERPL-CCNC: 83 U/L — SIGNIFICANT CHANGE UP (ref 40–120)
ALT FLD-CCNC: 41 U/L — SIGNIFICANT CHANGE UP (ref 12–78)
ANION GAP SERPL CALC-SCNC: 6 MMOL/L — SIGNIFICANT CHANGE UP (ref 5–17)
AST SERPL-CCNC: 32 U/L — SIGNIFICANT CHANGE UP (ref 15–37)
BILIRUB DIRECT SERPL-MCNC: 0.1 MG/DL — SIGNIFICANT CHANGE UP (ref 0–0.3)
BILIRUB INDIRECT FLD-MCNC: 0.2 MG/DL — SIGNIFICANT CHANGE UP (ref 0.2–1)
BILIRUB SERPL-MCNC: 0.3 MG/DL — SIGNIFICANT CHANGE UP (ref 0.2–1.2)
BUN SERPL-MCNC: 25 MG/DL — HIGH (ref 7–23)
CALCIUM SERPL-MCNC: 8.7 MG/DL — SIGNIFICANT CHANGE UP (ref 8.5–10.1)
CHLORIDE SERPL-SCNC: 103 MMOL/L — SIGNIFICANT CHANGE UP (ref 96–108)
CHOLEST SERPL-MCNC: 183 MG/DL — SIGNIFICANT CHANGE UP
CO2 SERPL-SCNC: 29 MMOL/L — SIGNIFICANT CHANGE UP (ref 22–31)
CREAT SERPL-MCNC: 0.52 MG/DL — SIGNIFICANT CHANGE UP (ref 0.5–1.3)
CULTURE RESULTS: SIGNIFICANT CHANGE UP
EGFR: 110 ML/MIN/1.73M2 — SIGNIFICANT CHANGE UP
GLUCOSE SERPL-MCNC: 106 MG/DL — HIGH (ref 70–99)
HDLC SERPL-MCNC: 51 MG/DL — SIGNIFICANT CHANGE UP
LIDOCAIN IGE QN: 3325 U/L — HIGH (ref 73–393)
LIPID PNL WITH DIRECT LDL SERPL: 114 MG/DL — HIGH
MAGNESIUM SERPL-MCNC: 1.6 MG/DL — SIGNIFICANT CHANGE UP (ref 1.6–2.6)
NON HDL CHOLESTEROL: 132 MG/DL — HIGH
PHOSPHATE SERPL-MCNC: 2 MG/DL — LOW (ref 2.5–4.5)
POTASSIUM SERPL-MCNC: 3.4 MMOL/L — LOW (ref 3.5–5.3)
POTASSIUM SERPL-SCNC: 3.4 MMOL/L — LOW (ref 3.5–5.3)
PROT SERPL-MCNC: 5.7 G/DL — LOW (ref 6–8.3)
SODIUM SERPL-SCNC: 138 MMOL/L — SIGNIFICANT CHANGE UP (ref 135–145)
SPECIMEN SOURCE: SIGNIFICANT CHANGE UP
TRIGL SERPL-MCNC: 87 MG/DL — SIGNIFICANT CHANGE UP

## 2023-05-26 PROCEDURE — 99233 SBSQ HOSP IP/OBS HIGH 50: CPT | Mod: GC

## 2023-05-26 PROCEDURE — 76705 ECHO EXAM OF ABDOMEN: CPT | Mod: 26

## 2023-05-26 RX ORDER — POTASSIUM PHOSPHATE, MONOBASIC POTASSIUM PHOSPHATE, DIBASIC 236; 224 MG/ML; MG/ML
30 INJECTION, SOLUTION INTRAVENOUS ONCE
Refills: 0 | Status: COMPLETED | OUTPATIENT
Start: 2023-05-26 | End: 2023-05-26

## 2023-05-26 RX ORDER — MAGNESIUM SULFATE 500 MG/ML
2 VIAL (ML) INJECTION ONCE
Refills: 0 | Status: COMPLETED | OUTPATIENT
Start: 2023-05-26 | End: 2023-05-26

## 2023-05-26 RX ORDER — SODIUM CHLORIDE 9 MG/ML
1000 INJECTION, SOLUTION INTRAVENOUS
Refills: 0 | Status: DISCONTINUED | OUTPATIENT
Start: 2023-05-26 | End: 2023-05-27

## 2023-05-26 RX ORDER — ENOXAPARIN SODIUM 100 MG/ML
40 INJECTION SUBCUTANEOUS EVERY 24 HOURS
Refills: 0 | Status: DISCONTINUED | OUTPATIENT
Start: 2023-05-26 | End: 2023-05-27

## 2023-05-26 RX ADMIN — SODIUM CHLORIDE 125 MILLILITER(S): 9 INJECTION, SOLUTION INTRAVENOUS at 08:17

## 2023-05-26 RX ADMIN — Medication 0.5 MILLIGRAM(S): at 17:55

## 2023-05-26 RX ADMIN — Medication 100 MILLIGRAM(S): at 12:02

## 2023-05-26 RX ADMIN — HEPARIN SODIUM 5000 UNIT(S): 5000 INJECTION INTRAVENOUS; SUBCUTANEOUS at 05:14

## 2023-05-26 RX ADMIN — SERTRALINE 100 MILLIGRAM(S): 25 TABLET, FILM COATED ORAL at 05:13

## 2023-05-26 RX ADMIN — Medication 25 GRAM(S): at 10:31

## 2023-05-26 RX ADMIN — SERTRALINE 100 MILLIGRAM(S): 25 TABLET, FILM COATED ORAL at 17:55

## 2023-05-26 RX ADMIN — SODIUM CHLORIDE 125 MILLILITER(S): 9 INJECTION, SOLUTION INTRAVENOUS at 16:41

## 2023-05-26 RX ADMIN — Medication 0.5 MILLIGRAM(S): at 13:19

## 2023-05-26 RX ADMIN — Medication 0.5 MILLIGRAM(S): at 21:28

## 2023-05-26 RX ADMIN — ENOXAPARIN SODIUM 40 MILLIGRAM(S): 100 INJECTION SUBCUTANEOUS at 12:59

## 2023-05-26 RX ADMIN — Medication 100 MILLIEQUIVALENT(S): at 00:35

## 2023-05-26 RX ADMIN — ARIPIPRAZOLE 5 MILLIGRAM(S): 15 TABLET ORAL at 12:03

## 2023-05-26 RX ADMIN — POTASSIUM PHOSPHATE, MONOBASIC POTASSIUM PHOSPHATE, DIBASIC 83.33 MILLIMOLE(S): 236; 224 INJECTION, SOLUTION INTRAVENOUS at 13:04

## 2023-05-26 RX ADMIN — BENZOCAINE AND MENTHOL 1 LOZENGE: 5; 1 LIQUID ORAL at 05:13

## 2023-05-26 NOTE — DISCHARGE NOTE PROVIDER - NSDCCPTREATMENT_GEN_ALL_CORE_FT
PRINCIPAL PROCEDURE  Procedure: CT  Findings and Treatment:   ACC: [id number] EXAM:  CT ABDOMEN AND PELVIS IC   ORDERED BY: RAUL CASTANEDA   PROCEDURE DATE:  05/25/2023    INTERPRETATION:  CLINICAL INFORMATION: Pancreatitis  COMPARISON: None.  CONTRAST/COMPLICATIONS:  IV Contrast: Omnipaque 350  90 cc administered   10 cc discarded  Oral Contrast: NONE  Complications: None reported at time of study completion  PROCEDURE:  CT of the Abdomen and Pelvis was performed.  Sagittal and coronal reformats were performed.  FINDINGS:  LOWER CHEST: No visualized pleural effusion. Minimal dependent scarring.  LIVER: Liver size within normal limits. Probable st  < end of copied text >  eatosis. Main portal   vein is patent. Partially recanalized paraumbilical vein.  BILE DUCTS: No distention  GALLBLADDER: Unremarkable CTappearance  SPLEEN: Spleen size within normal limits  PANCREAS: No acute peripancreatic inflammation. Correlate with lipase   value and clinical exam if there is concern for pancreatitis.  ADRENALS: Unremarkable  KIDNEYS/URETERS: No hydronephrosis  BLADDER: Minimally distended.  REPRODUCTIVE ORGANS: Uterus and adnexa are suboptimally characterized on   CT  BOWEL: In his distal esophagus is nondistended, suboptimally assessed.   ajStomach is minimally distended with thickened gastric rugal folds. No   small bowel distention. Appendix is not obstructed. Mild right   pericolonic inflammatory changes concerning for colitis.  PERITONEUM: No ascites  VESSELS: No abdominal aortic aneurysm. Aortoiliac atheromatous changes.  RETROPERITONEUM/LYMPH NODES: Small volume nodes  ABDOMINAL WALL: Unremarkable  BONES: Degenerative changes.  IMPRESSION:  No acute peripancreatic inflammation. Correlate with lipase value and   clinical exam if there is concern for pancreatitis.  Right-sided mild colitis. Question gastritis.  --- End of Report ---  CHELSIE BERG M.D., ATTENDING RADIOLOGIST  This document has been electronically signed. May 26 2023  8:27AM< from: CT Abdomen and Pelvis w/ IV Cont (05.25.23 @ 22:37) >        SECONDARY PROCEDURE  Procedure: Ultrasound  Findings and Treatment:

## 2023-05-26 NOTE — DISCHARGE NOTE PROVIDER - PROVIDER TOKENS
PROVIDER:[TOKEN:[[id number]],FOLLOWUP:[1 week],ESTABLISHEDPATIENT:[T]] PROVIDER:[TOKEN:[[id number]],FOLLOWUP:[1 week],ESTABLISHEDPATIENT:[T]],PROVIDER:[TOKEN:[[id number]]]

## 2023-05-26 NOTE — PROGRESS NOTE ADULT - ATTENDING COMMENTS
53yo F PMH ETOH dependence presents to ED for abnormal labs, admit for electrolyte abnormality and severe dehydration, elevated lipase found to have pancreatitis.    Patient seen and examined at bedside. States she is feeling better. Denies any nausea/vomiting, chest pain, abd pain, SOB. Continue IVF for suspected pancreatitis, lipase level increasing. F/U US RUQ. Will start on CLD after US. RONAK resolved, Cr improved. Continue CIWA protocol.

## 2023-05-26 NOTE — CARE COORDINATION ASSESSMENT. - OTHER PERTINENT REFERRAL INFORMATION
JOE met with pt. bedside to complete assessment, introduce self and explain role with good understanding. Pt. is from home and reports being independent in all ADLs. Pt. 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 drives herself. SBIRT marked complete, pt. reports not drinking etoh for the past 2 weeks, has been encouraged to reduce drinking in the past and did so. Pt. reports smoking cigarettes 1 ppd, interested in smoking cessation. Pt. anticipates returning home upon d/c, her  can pick her up.

## 2023-05-26 NOTE — CONSULT NOTE ADULT - ASSESSMENT
EtOH dependence  Pancreatitis  ?colitis and gastritis    CT noted d/w pt  Advance diet as tolerated  PPI once daily  IVF  Pain control  Monitor lipase  If develops diarrhea consider GI PCR  D/w patient    I reviewed the overnight course of events on the unit, re-confirming the patient history. I discussed the care with the patient and their family  Differential diagnosis and plan of care discussed with patient after the evaluation  40 minutes spent on total encounter of which more than fifty percent of the encounter was spent counseling and/or coordinating care by the attending physician.  Advanced care planning was discussed with patient and family.  Advanced care planning forms were reviewed and discussed.  Risks, benefits and alternatives of gastroenterologic procedures were discussed in detail and all questions were answered. EtOH dependence  Pancreatitis  ?colitis and gastritis    CT noted d/w pt  Advance diet as tolerated  PPI once daily  IVF  Pain control  no need to trend lipase  If develops diarrhea consider GI PCR  D/w patient    I reviewed the overnight course of events on the unit, re-confirming the patient history. I discussed the care with the patient and their family  Differential diagnosis and plan of care discussed with patient after the evaluation  40 minutes spent on total encounter of which more than fifty percent of the encounter was spent counseling and/or coordinating care by the attending physician.  Advanced care planning was discussed with patient and family.  Advanced care planning forms were reviewed and discussed.  Risks, benefits and alternatives of gastroenterologic procedures were discussed in detail and all questions were answered.

## 2023-05-26 NOTE — CONSULT NOTE ADULT - SUBJECTIVE AND OBJECTIVE BOX
[facility]  Alexandro Parra PA-C  [address] [place] [address] [id number] [phone]      Chief Complaint:  Patient is a 52y old  Female who presents with a chief complaint of weakness, electrolyte abnormality (26 May 2023 11:37)      HPI:  51yo F PMH ETOH dependence presents to ED for abnormal labs. PCP informed her that WBC was elevated, K is low, and she may have pancreatitis. Patient states that she has been sick for 1 week, initially with vomiting for several days; vomiting has resolved but patient complains of persisting weakness and malaise. Denies fever, recent travel, sick contacts. Of note, patient drinks vodka daily but has not consumed alcohol in 3 days because she has not felt well.    She went to see her PCP on  who did some bloodwork; was told to go to the hospital for further medical care today.    INTERVAL HPI:  Pt s/e  Reports same hx as above  Tolerating clear liquids without GI complaints  No current nausea/vomiting    Allergies:  No Known Allergies      Medications:  ALPRAZolam 0.5 milliGRAM(s) Oral three times a day PRN  ARIPiprazole 5 milliGRAM(s) Oral daily  enoxaparin Injectable 40 milliGRAM(s) SubCutaneous every 24 hours  lactated ringers. 1000 milliLiter(s) IV Continuous <Continuous>  LORazepam   Injectable 2 milliGRAM(s) IV Push every 1 hour PRN  sertraline 100 milliGRAM(s) Oral two times a day  thiamine Injectable 100 milliGRAM(s) IV Push daily      PMHX/PSHX:  EtOH dependence    HTN (hypertension)    Anxiety        Family history:  FHx: myocardial infarction (Father)    FHx: stroke (Father)    FHx: diabetes mellitus (Father)    FHx: brain cancer (Mother)      ROS:     General:  No fevers, chills, night sweats, fatigue  Eyes:  Good vision, no reported pain  ENT:  No sore throat, pain, runny nose, dysphagia  CV:  No pain, palpitations, hypo/hypertension  Resp:  No dyspnea, cough, tachypnea, wheezing  GI:  No pain, +nausea, +vomiting, No diarrhea, No constipation, No weight loss, No fever, No pruritis, No rectal bleeding, No tarry stools, No dysphagia  :  No pain, bleeding, incontinence, nocturia  Muscle:  No pain, weakness  Neuro:  No weakness, tingling, memory problems  Psych:  No fatigue, insomnia, mood problems, depression  Endocrine:  No polyuria, polydipsia, cold/heat intolerance  Heme:  No petechiae, ecchymosis, easy bruisability  Skin:  No rash, tattoos, scars, edema      PHYSICAL EXAM:   Vital Signs:  Vital Signs Last 24 Hrs  T(C): 36.6 (26 May 2023 11:20), Max: 36.7 (25 May 2023 23:02)  T(F): 97.8 (26 May 2023 11:20), Max: 98.1 (25 May 2023 23:02)  HR: 78 (26 May 2023 13:20) (72 - 91)  BP: 135/82 (26 May 2023 13:20) (88/56 - 135/82)  BP(mean): --  RR: 18 (26 May 2023 13:20) (18 - 19)  SpO2: 98% (26 May 2023 13:20) (92% - 99%)    Parameters below as of 26 May 2023 13:20  Patient On (Oxygen Delivery Method): room air      Daily     Daily Weight in k (26 May 2023 04:42)    GENERAL:  Appears stated age  HEENT:  NC/AT  CHEST:  Full & symmetric excursion  HEART:  Regular rhythm  ABDOMEN:  Soft, non-tender, non-distended  EXTEREMITIES:  no cyanosis,clubbing or edema  SKIN:  No rash  NEURO:  Alert    LABS:                        13.9   11.63 )-----------( 237      ( 25 May 2023 16:49 )             38.4         138  |  103  |  25<H>  ----------------------------<  106<H>  3.4<L>   |  29  |  0.52    Ca    8.7      26 May 2023 06:55  Phos  2.0       Mg     1.6         TPro  5.7<L>  /  Alb  2.8<L>  /  TBili  0.3  /  DBili  0.1  /  AST  32  /  ALT  41  /  AlkPhos  83      LIVER FUNCTIONS - ( 26 May 2023 06:55 )  Alb: 2.8 g/dL / Pro: 5.7 g/dL / ALK PHOS: 83 U/L / ALT: 41 U/L / AST: 32 U/L / GGT: x             Urinalysis Basic - ( 25 May 2023 20:50 )    Color: Pale Yellow / Appearance: Clear / S.005 / pH: x  Gluc: x / Ketone: Negative  / Bili: Negative / Urobili: Negative   Blood: x / Protein: Negative / Nitrite: Negative   Leuk Esterase: Trace / RBC: 0-2 /HPF / WBC 0-2   Sq Epi: x / Non Sq Epi: x / Bacteria: x      Lipase [id number]     Amylase [id number]        Lipase [id number]       Imaging:  < from: CT Abdomen and Pelvis w/ IV Cont (23 @ 22:37) >    ACC: [id number] EXAM:  CT ABDOMEN AND PELVIS IC   ORDERED BY: RAUL CASTANEDA     PROCEDURE DATE:  2023          INTERPRETATION:  CLINICAL INFORMATION: Pancreatitis    COMPARISON: None.    CONTRAST/COMPLICATIONS:  IV Contrast: Omnipaque 350  90 cc administered   10 cc discarded  Oral Contrast: NONE  Complications: None reported at time of study completion    PROCEDURE:  CT of the Abdomen and Pelvis was performed.  Sagittal and coronal reformats were performed.    FINDINGS:    LOWER CHEST: No visualized pleural effusion. Minimal dependent scarring.    LIVER: Liver size within normal limits. Probable steatosis. Main portal   vein is patent. Partially recanalized paraumbilical vein.  BILE DUCTS: No distention  GALLBLADDER: Unremarkable CTappearance  SPLEEN: Spleen size within normal limits  PANCREAS: No acute peripancreatic inflammation. Correlate with lipase   value and clinical exam if there is concern for pancreatitis.  ADRENALS: Unremarkable  KIDNEYS/URETERS: No hydronephrosis    BLADDER: Minimally distended.  REPRODUCTIVE ORGANS: Uterus and adnexa are suboptimally characterized on   CT    BOWEL: In his distal esophagus is nondistended, suboptimally assessed.   ajStomach is minimally distended with thickened gastric rugal folds. No   small bowel distention. Appendix is not obstructed. Mild right   pericolonic inflammatory changes concerning for colitis.  PERITONEUM: No ascites  VESSELS: No abdominal aortic aneurysm. Aortoiliac atheromatous changes.  RETROPERITONEUM/LYMPH NODES: Small volume nodes  ABDOMINAL WALL: Unremarkable  BONES: Degenerative changes.    IMPRESSION:    No acute peripancreatic inflammation. Correlate with lipase value and   clinical exam if there is concern for pancreatitis.    Right-sided mild colitis. Question gastritis.    --- End of Report ---      CHELSIE BERG M.D., ATTENDING RADIOLOGIST  This document has been electronically signed. May 26 2023  8:27AM    < end of copied text >

## 2023-05-26 NOTE — DISCHARGE NOTE PROVIDER - ATTENDING DISCHARGE PHYSICAL EXAMINATION:
GENERAL:  Not in acute distress, lying in bed comfortably  HEENT:  PERRLA  CHEST:  CTA B/L, no wheezes   HEART:  Regular rate and rhythm, no murmurs, rubs, gallops  ABDOMEN:  Soft, non-tender, non-distended  EXTREMITIES:  no cyanosis, no LE edema  SKIN:  Warm, well perfused  NEURO:  Alert, Conversing appropriately, A&Ox3  PSYCH: Not emotionally labile, appropriate affect

## 2023-05-26 NOTE — PROGRESS NOTE ADULT - PROBLEM SELECTOR PLAN 2
Na 131, K 2.3 on admission  - Likely 2/2 vomiting, severe dehydration  - Hyponatremia resolved s/p fluids  - Replete K/Mg/Phos, continue to monitor and replete electrolytes

## 2023-05-26 NOTE — PHARMACOTHERAPY INTERVENTION NOTE - COMMENTS
Search Terms: Felicia Salinas, 1970Search Date: 05/26/2023 07:57:39 AM  The Drug Utilization Report below displays all of the controlled substance prescriptions, if any, that your patient has filled in the last twelve months. The information displayed on this report is compiled from pharmacy submissions to the Department, and accurately reflects the information as submitted by the pharmacies.    This report was requested by: Quin Be | Reference #: [id number]    Practitioner Count: 2  Pharmacy Count: 1  Current Opioid Prescriptions: 0  Current Benzodiazepine Prescriptions: 0  Current Stimulant Prescriptions: 0      Patient Demographic Information (PDI)       PDI	First Name	Last Name	Birth Date	Gender	Street Address	[facility]	Zip Code  A	Felicia Salinas	1970	Female	[address] [address]    Prescription Information      PDI Filter:    PDI	Current Rx	Drug Type	Rx Written	Rx Dispensed	Drug	Quantity	Days Supply	Prescriber Name	Prescriber ELSIE #	Payment Method	Dispenser  A	N	B	04/25/2023	04/26/2023	alprazolam 0.5 mg tablet	60	20	Manuel Gamez 	[id number]	Insurance	[id number]  A	N	B	03/01/2023	03/02/2023	chlordiazepoxide 25 mg capsule	15	4	Sindy Anderson MD	[id number]	Insurance	[id number]  A	N	B	03/02/2023	03/02/2023	alprazolam 0.5 mg tablet	60	20	Manuel Gamez DO	[id number]	Insurance	[id number]  A	N	B	11/03/2022	11/04/2022	alprazolam 0.5 mg tablet	90	30	Manuel Gamez DO	[id number]	Insurance	[id number]

## 2023-05-26 NOTE — PROGRESS NOTE ADULT - PROBLEM SELECTOR PLAN 1
Lipase 3190, Amylase 228 on admission. s/p 2L NS bolus in ED  - Continued on LR 125ccs/hr  - CT A/P: Pancreatic inflammation, mild R colitis, questionable gastritis  - RUQ US ordered, restarted diet pending results  - Lipase increasing 3325  - Lipid panel unremarkable, RUPERT negative  - Avoid morphine as can exacerbate spasms of Sphincter of Oddi  - CIWA protocol, scoring low on CIWA  - GI (Dr. Garnica) consulted, f/u recs Lipase 3190, Amylase 228 on admission. s/p 2L NS bolus in ED  - Continued on LR 125ccs/hr  - CT A/P: Pancreatic inflammation, mild R colitis, questionable gastritis  - RUQ US: No sonographic evidence of acute cholecystitis. No biliary distention. Steatosis. Pancreas is poorly assessed on ultrasound.  - Lipase increasing 3325  - Lipid panel unremarkable, RUPERT negative  - Clear liquid diet for now  - Avoid morphine as can exacerbate spasms of Sphincter of Oddi  - CIWA protocol, scoring low on CIWA  - GI (Dr. Garnica) consulted, f/u recs

## 2023-05-26 NOTE — DISCHARGE NOTE PROVIDER - HOSPITAL COURSE
HPI:  51yo F PMH ETOH dependence presents to ED for abnormal labs. PCP informed her that WBC was elevated, K is low, and she may have pancreatitis. Patient states that she has been sick for 1 week, initially with vomiting for several days; vomiting has resolved but patient complains of persisting weakness and malaise. Denies fever, recent travel, sick contacts. Of note, patient drinks vodka daily but has not consumed alcohol in 3 days because she has not felt well.    She went to see her PCP on Tuesday 5/23 who did some bloodwork; was told to go to the hospital for further medical care today.    In ED:  Vital signs: T 97.5F, HR 81, BP 88/56, RR 18, SpO2 99% on RA  Notable labs: WBC 11.63, Amylase 228, Na 131, K 2.3, BUN 51, Cr 1.40, AST 44, GFR 45, Lipase 3190  CXR: No acute cardiopulmonary disease process.  EKG: NSR @ 75bpm, prolonged QTc @ 553ms  Given: Klor-Con 40mEq x1, IV KCl 10 x 4, 2L NS bolus (25 May 2023 20:20)      ---  HOSPITAL COURSE: Pt was admitted and found to have pancreatitis with elevated lipids and CT findings. Abdominal ultrasound showed ______. Pt's hypotension resolved with fluids. Electrolytes were repleted as necessary and diet was advanced as tolerated. Pt was stable for discharge.    ---  CONSULTANTS:   GI: Dr. Garnica     ---  TIME SPENT:  I, the attending physician, was physically present for the key portions of the evaluation and management (E/M) service provided. The total amount of time spent reviewing the hospital notes, laboratory values, imaging findings, assessing/counseling the patient, discussing with consultant physicians, social work, nursing staff was -- minutes    ---  Primary care provider was made aware of plan for discharge:      [  ] NO     [  ] YES   HPI:  51yo F PMH ETOH dependence presents to ED for abnormal labs. PCP informed her that WBC was elevated, K is low, and she may have pancreatitis. Patient states that she has been sick for 1 week, initially with vomiting for several days; vomiting has resolved but patient complains of persisting weakness and malaise. Denies fever, recent travel, sick contacts. Of note, patient drinks vodka daily but has not consumed alcohol in 3 days because she has not felt well.    She went to see her PCP on Tuesday 5/23 who did some bloodwork; was told to go to the hospital for further medical care today.    In ED:  Vital signs: T 97.5F, HR 81, BP 88/56, RR 18, SpO2 99% on RA  Notable labs: WBC 11.63, Amylase 228, Na 131, K 2.3, BUN 51, Cr 1.40, AST 44, GFR 45, Lipase 3190  CXR: No acute cardiopulmonary disease process.  EKG: NSR @ 75bpm, prolonged QTc @ 553ms  Given: Klor-Con 40mEq x1, IV KCl 10 x 4, 2L NS bolus (25 May 2023 20:20)      ---  HOSPITAL COURSE: Pt was admitted and found to have pancreatitis with elevated lipase levels and CT findings of pancreatic inflammation, mild R.colitis, and questionable gastritis. RUQ US was also obtained which showed no billary distension, but pancreas not well visualized. Pt's hypotension resolved with fluids which were continued at 125cc/hr throughout hospital admission for pancreatitis as well. Electrolytes were repleted as necessary and diet was advanced to regular diet which patient tolerated. GI was consulted on admission who recommended IVF and protonix which was started for gastritis. Patients lipase downtrended during hospital course. Patient's symptoms improved throughout hospital course and was stable for discharge with increased liquid intake and follow up with GI in 2 weeks.     Vital Signs Last 24 Hrs  T(C): 37.5 (27 May 2023 06:03), Max: 37.5 (27 May 2023 06:03)  T(F): 99.5 (27 May 2023 06:03), Max: 99.5 (27 May 2023 06:03)  HR: 81 (27 May 2023 06:03) (78 - 85)  BP: 144/82 (27 May 2023 06:03) (110/69 - 144/82)  BP(mean): --  RR: 18 (27 May 2023 06:03) (17 - 18)  SpO2: 93% (27 May 2023 06:03) (93% - 98%)    Parameters below as of 27 May 2023 06:03  Patient On (Oxygen Delivery Method): room air    PHYSICAL:  GENERAL:  Not in acute distress, lying in bed comfortably  HEENT:  PERRLA  CHEST:  CTAB, no wheezes   HEART:  Regular rate and rhythm, no murmurs, rubs, gallops  ABDOMEN:  Soft, non-tender, non-distended  EXTREMITIES:  no cyanosis, no LE edema  SKIN:  Warm, well perfused  NEURO:  Alert, Conversing appropriately, A&Ox3  PSYCH: Not emotionally labile, appropriate affect     ---  CONSULTANTS:   ABENA: Dr. Garnica     ---  TIME SPENT:  I, the attending physician, was physically present for the key portions of the evaluation and management (E/M) service provided. The total amount of time spent reviewing the hospital notes, laboratory values, imaging findings, assessing/counseling the patient, discussing with consultant physicians, social work, nursing staff was -- minutes    ---  Primary care provider was made aware of plan for discharge:      [  ] NO     [  ] YES   HPI:  51yo F PMH ETOH dependence presents to ED for abnormal labs. PCP informed her that WBC was elevated, K is low, and she may have pancreatitis. Patient states that she has been sick for 1 week, initially with vomiting for several days; vomiting has resolved but patient complains of persisting weakness and malaise. Denies fever, recent travel, sick contacts. Of note, patient drinks vodka daily but has not consumed alcohol in 3 days because she has not felt well.    She went to see her PCP on Tuesday 5/23 who did some bloodwork; was told to go to the hospital for further medical care today.    In ED:  Vital signs: T 97.5F, HR 81, BP 88/56, RR 18, SpO2 99% on RA  Notable labs: WBC 11.63, Amylase 228, Na 131, K 2.3, BUN 51, Cr 1.40, AST 44, GFR 45, Lipase 3190  CXR: No acute cardiopulmonary disease process.  EKG: NSR @ 75bpm, prolonged QTc @ 553ms  Given: Klor-Con 40mEq x1, IV KCl 10 x 4, 2L NS bolus (25 May 2023 20:20)      ---  HOSPITAL COURSE: Pt was admitted and found to have pancreatitis with elevated lipase levels and CT findings of pancreatic inflammation, mild R.colitis, and questionable gastritis. RUQ US was also obtained which showed no billary distension, but pancreas not well visualized. Pt's hypotension resolved with fluids which were continued at 125cc/hr throughout hospital admission for pancreatitis as well. Electrolytes were repleted as necessary and diet was advanced to regular diet which patient tolerated. GI was consulted on admission who recommended IVF and protonix which was started for gastritis. Patients lipase downtrended during hospital course. Patient's symptoms improved throughout hospital course and was stable for discharge with increased liquid intake and follow up with GI in 2 weeks.     Vital Signs Last 24 Hrs  T(C): 37.5 (27 May 2023 06:03), Max: 37.5 (27 May 2023 06:03)  T(F): 99.5 (27 May 2023 06:03), Max: 99.5 (27 May 2023 06:03)  HR: 81 (27 May 2023 06:03) (78 - 85)  BP: 144/82 (27 May 2023 06:03) (110/69 - 144/82)  BP(mean): --  RR: 18 (27 May 2023 06:03) (17 - 18)  SpO2: 93% (27 May 2023 06:03) (93% - 98%)    Parameters below as of 27 May 2023 06:03  Patient On (Oxygen Delivery Method): room air    PHYSICAL:  GENERAL:  Not in acute distress, lying in bed comfortably  HEENT:  PERRLA  CHEST:  CTAB, no wheezes   HEART:  Regular rate and rhythm, no murmurs, rubs, gallops  ABDOMEN:  Soft, non-tender, non-distended  EXTREMITIES:  no cyanosis, no LE edema  SKIN:  Warm, well perfused  NEURO:  Alert, Conversing appropriately, A&Ox3  PSYCH: Not emotionally labile, appropriate affect     ---  CONSULTANTS:   ABENA: Dr. Garnica     ---  TIME SPENT:  I, the attending physician, was physically present for the key portions of the evaluation and management (E/M) service provided. The total amount of time spent reviewing the hospital notes, laboratory values, imaging findings, assessing/counseling the patient, discussing with consultant physicians, social work, nursing staff was 44 minutes

## 2023-05-26 NOTE — CARE COORDINATION ASSESSMENT. - NSCAREPROVIDERS_GEN_ALL_CORE_FT
CARE PROVIDERS:  Accepting Physician: Ravi Anderson  Administration: Ophleia Nava  Administration: Raeann Hickman  Admitting: Ravi Anderson  Attending: Hesham Mello  Covering Team: Kamron Mann  Covering Team: April Harkins  ED Attending: Minerva Louie  ED Attending2: Jocelyne Starr  ED Nurse: Yuliana Danielson  Emergency Medicine: Jalyn Fox  Nurse: Wendy Wang  Nurse: Sugar Amanda  Nurse: Kim Suggs  Ordered: ADM, User  Ordered: Doctor, Unknown  Override: Kim Suggs  Override: Silvia Lowe  Override: Wendy Wang  PCA/Nursing Assistant: Eunice Calhoun  Primary Team: Fidel Love  Primary Team: Rahul Madrid  Primary Team: Hesham Mello  Primary Team: Priscilla Oliveros  Registered Dietitian: Minerva De Los Santos  : Germaine Hogue

## 2023-05-26 NOTE — PROGRESS NOTE ADULT - SUBJECTIVE AND OBJECTIVE BOX
Patient is a 52y old  Female who presents with a chief complaint of weakness, electrolyte abnormality (25 May 2023 20:20)      Subjective:  INTERVAL HPI/OVERNIGHT EVENTS: Pt admitted overnight. Reportedly ate an entire bag a food brought by family. Pt is hungry, no complaints; pt denies fever/chills, lightheadedness/dizziness, cp/palp, sob/cough, abd pain, n/v, c/d. Last CIWA score 4.      REVIEW OF SYSTEMS:  CONSTITUTIONAL: Hungry. No fever/chills.  HENMT: No HA, lightheadedness/dizziness  RESPIRATORY: No cough, wheezing, hemoptysis; No shortness of breath.  CARDIOVASCULAR: No chest pain, palpitations.  GASTROINTESTINAL: No abdominal or epigastric pain. No nausea or vomiting; No diarrhea or constipation.  GENITOURINARY: No dysuria, changes in frequency, hematuria, or incontinence  NEUROLOGICAL: Baseline strength. Sensation intact bilaterally.  MUSCULOSKELETAL: No joint pain; No muscle, back, or extremity pain     PHYSICAL:  GENERAL:  Not in acute distress, lying in bed comfortably  HEENT:  PERRLA  CHEST:  CTAB  HEART:  Regular rate and rhythm, no murmurs, rubs, gallops  ABDOMEN:  Soft, non-tender, non-distended,  EXTREMITIES:  no cyanosis, no LE edema  SKIN:  Warm, well perfused  NEURO:  Alert, Conversing appropriately, A&Ox3  PSYCH: Not emotionally labile, appropriate affect     Objective:  Vital Signs Last 24 Hrs  T(C): 36.7 (26 May 2023 04:42), Max: 36.7 (25 May 2023 23:02)  T(F): 98.1 (26 May 2023 04:42), Max: 98.1 (25 May 2023 23:02)  HR: 72 (26 May 2023 04:42) (72 - 91)  BP: 99/64 (26 May 2023 04:42) (88/56 - 120/72)  BP(mean): --  RR: 18 (26 May 2023 04:42) (18 - 19)  SpO2: 92% (26 May 2023 04:42) (92% - 99%)    Parameters below as of 26 May 2023 04:42  Patient On (Oxygen Delivery Method): room air        MEDICATIONS  (STANDING):  ARIPiprazole 5 milliGRAM(s) Oral daily  heparin   Injectable 5000 Unit(s) SubCutaneous every 8 hours  lactated ringers. 1000 milliLiter(s) (125 mL/Hr) IV Continuous <Continuous>  sertraline 100 milliGRAM(s) Oral two times a day  thiamine Injectable 100 milliGRAM(s) IV Push daily    MEDICATIONS  (PRN):  ALPRAZolam 0.5 milliGRAM(s) Oral three times a day PRN for anxiety  LORazepam   Injectable 2 milliGRAM(s) IV Push every 1 hour PRN Symptom-triggered: each CIWA -Ar score 8 or GREATER      Allergies    No Known Allergies    Intolerances        LABS:                        13.9   11.63 )-----------( 237      ( 25 May 2023 16:49 )             38.4     CBC Full  -  ( 25 May 2023 16:49 )  WBC Count : 11.63 K/uL  Hemoglobin : 13.9 g/dL  Hematocrit : 38.4 %  Platelet Count - Automated : 237 K/uL  Mean Cell Volume : 90.1 fl  Mean Cell Hemoglobin : 32.6 pg  Mean Cell Hemoglobin Concentration : 36.2 gm/dL  Auto Neutrophil # : 7.92 K/uL  Auto Lymphocyte # : 2.47 K/uL  Auto Monocyte # : 1.07 K/uL  Auto Eosinophil # : 0.07 K/uL  Auto Basophil # : 0.03 K/uL  Auto Neutrophil % : 68.1 %  Auto Lymphocyte % : 21.2 %  Auto Monocyte % : 9.2 %  Auto Eosinophil % : 0.6 %  Auto Basophil % : 0.3 %    26 May 2023 06:55    138    |  103    |  25     ----------------------------<  106    3.4     |  29     |  0.52     Ca    8.7        26 May 2023 06:55  Phos  2.0       26 May 2023 06:55  Mg     1.6       26 May 2023 06:55    TPro  5.7    /  Alb  2.8    /  TBili  0.3    /  DBili  0.1    /  AST  32     /  ALT  41     /  AlkPhos  83     26 May 2023 06:55      Urinalysis Basic - ( 25 May 2023 20:50 )    Color: Pale Yellow / Appearance: Clear / S.005 / pH: x  Gluc: x / Ketone: Negative  / Bili: Negative / Urobili: Negative   Blood: x / Protein: Negative / Nitrite: Negative   Leuk Esterase: Trace / RBC: 0-2 /HPF / WBC 0-2   Sq Epi: x / Non Sq Epi: x / Bacteria: x      CAPILLARY BLOOD GLUCOSE      POCT Blood Glucose.: 99 mg/dL (26 May 2023 08:06)          RADIOLOGY & ADDITIONAL TESTS:    Personally reviewed.     Consultant(s) Notes Reviewed:  [x] YES  [ ] NO     Patient is a 52y old  Female who presents with a chief complaint of weakness, electrolyte abnormality (25 May 2023 20:20)      Subjective:  INTERVAL HPI/OVERNIGHT EVENTS: Pt admitted overnight. Reportedly ate an entire bag a food brought by family. Pt is hungry, no complaints; pt denies fever/chills, lightheadedness/dizziness, cp/palp, sob/cough, abd pain, n/v, c/d. Last CIWA score 4.      REVIEW OF SYSTEMS:  CONSTITUTIONAL: +Hungry. No fever/chills.  HENMT: No HA, lightheadedness/dizziness  RESPIRATORY: No cough, wheezing, hemoptysis; No shortness of breath.  CARDIOVASCULAR: No chest pain, palpitations.  GASTROINTESTINAL: No abdominal or epigastric pain. No nausea or vomiting; No diarrhea or constipation.  GENITOURINARY: No dysuria, changes in frequency, hematuria, or incontinence  NEUROLOGICAL: Baseline strength. Sensation intact bilaterally.  MUSCULOSKELETAL: No joint pain; No muscle, back, or extremity pain     PHYSICAL:  GENERAL:  Not in acute distress, lying in bed comfortably  HEENT:  PERRLA  CHEST:  CTAB  HEART:  Regular rate and rhythm, no murmurs, rubs, gallops  ABDOMEN:  Soft, non-tender, non-distended,  EXTREMITIES:  no cyanosis, no LE edema  SKIN:  Warm, well perfused  NEURO:  Alert, Conversing appropriately, A&Ox3  PSYCH: Not emotionally labile, appropriate affect     Objective:  Vital Signs Last 24 Hrs  T(C): 36.7 (26 May 2023 04:42), Max: 36.7 (25 May 2023 23:02)  T(F): 98.1 (26 May 2023 04:42), Max: 98.1 (25 May 2023 23:02)  HR: 72 (26 May 2023 04:42) (72 - 91)  BP: 99/64 (26 May 2023 04:42) (88/56 - 120/72)  BP(mean): --  RR: 18 (26 May 2023 04:42) (18 - 19)  SpO2: 92% (26 May 2023 04:42) (92% - 99%)    Parameters below as of 26 May 2023 04:42  Patient On (Oxygen Delivery Method): room air        MEDICATIONS  (STANDING):  ARIPiprazole 5 milliGRAM(s) Oral daily  heparin   Injectable 5000 Unit(s) SubCutaneous every 8 hours  lactated ringers. 1000 milliLiter(s) (125 mL/Hr) IV Continuous <Continuous>  sertraline 100 milliGRAM(s) Oral two times a day  thiamine Injectable 100 milliGRAM(s) IV Push daily    MEDICATIONS  (PRN):  ALPRAZolam 0.5 milliGRAM(s) Oral three times a day PRN for anxiety  LORazepam   Injectable 2 milliGRAM(s) IV Push every 1 hour PRN Symptom-triggered: each CIWA -Ar score 8 or GREATER      Allergies    No Known Allergies    Intolerances        LABS:                        13.9   11.63 )-----------( 237      ( 25 May 2023 16:49 )             38.4     CBC Full  -  ( 25 May 2023 16:49 )  WBC Count : 11.63 K/uL  Hemoglobin : 13.9 g/dL  Hematocrit : 38.4 %  Platelet Count - Automated : 237 K/uL  Mean Cell Volume : 90.1 fl  Mean Cell Hemoglobin : 32.6 pg  Mean Cell Hemoglobin Concentration : 36.2 gm/dL  Auto Neutrophil # : 7.92 K/uL  Auto Lymphocyte # : 2.47 K/uL  Auto Monocyte # : 1.07 K/uL  Auto Eosinophil # : 0.07 K/uL  Auto Basophil # : 0.03 K/uL  Auto Neutrophil % : 68.1 %  Auto Lymphocyte % : 21.2 %  Auto Monocyte % : 9.2 %  Auto Eosinophil % : 0.6 %  Auto Basophil % : 0.3 %    26 May 2023 06:55    138    |  103    |  25     ----------------------------<  106    3.4     |  29     |  0.52     Ca    8.7        26 May 2023 06:55  Phos  2.0       26 May 2023 06:55  Mg     1.6       26 May 2023 06:55    TPro  5.7    /  Alb  2.8    /  TBili  0.3    /  DBili  0.1    /  AST  32     /  ALT  41     /  AlkPhos  83     26 May 2023 06:55      Urinalysis Basic - ( 25 May 2023 20:50 )    Color: Pale Yellow / Appearance: Clear / S.005 / pH: x  Gluc: x / Ketone: Negative  / Bili: Negative / Urobili: Negative   Blood: x / Protein: Negative / Nitrite: Negative   Leuk Esterase: Trace / RBC: 0-2 /HPF / WBC 0-2   Sq Epi: x / Non Sq Epi: x / Bacteria: x      CAPILLARY BLOOD GLUCOSE      POCT Blood Glucose.: 99 mg/dL (26 May 2023 08:06)          RADIOLOGY & ADDITIONAL TESTS:    Personally reviewed.     Consultant(s) Notes Reviewed:  [x] YES  [ ] NO     Patient is a 52y old  Female who presents with a chief complaint of weakness, electrolyte abnormality (25 May 2023 20:20)      Subjective:  INTERVAL HPI/OVERNIGHT EVENTS: Pt admitted overnight. Reportedly ate an entire bag a food brought by family. Pt is hungry, no complaints; pt denies fever/chills, lightheadedness/dizziness, cp/palp, sob/cough, abd pain, n/v, c/d. Last CIWA score 4.      REVIEW OF SYSTEMS:  CONSTITUTIONAL: +Hungry. No fever/chills.  HENMT: No HA, lightheadedness/dizziness  RESPIRATORY: No cough, wheezing, hemoptysis; No shortness of breath.  CARDIOVASCULAR: No chest pain, palpitations.  GASTROINTESTINAL: No abdominal or epigastric pain. No nausea or vomiting; No diarrhea or constipation.  GENITOURINARY: No dysuria, changes in frequency, hematuria, or incontinence  NEUROLOGICAL: Baseline strength. Sensation intact bilaterally.  MUSCULOSKELETAL: No joint pain; No muscle, back, or extremity pain     PHYSICAL:  GENERAL:  Not in acute distress, lying in bed comfortably  HEENT:  PERRLA  CHEST:  CTAB  HEART:  Regular rate and rhythm, no murmurs, rubs, gallops  ABDOMEN:  Soft, non-tender, non-distended,  EXTREMITIES:  no cyanosis, no LE edema  SKIN:  Warm, well perfused  NEURO:  Alert, Conversing appropriately, A&Ox3  PSYCH: Not emotionally labile, appropriate affect     Objective:  Vital Signs Last 24 Hrs  T(C): 36.7 (26 May 2023 04:42), Max: 36.7 (25 May 2023 23:02)  T(F): 98.1 (26 May 2023 04:42), Max: 98.1 (25 May 2023 23:02)  HR: 72 (26 May 2023 04:42) (72 - 91)  BP: 99/64 (26 May 2023 04:42) (88/56 - 120/72)  BP(mean): --  RR: 18 (26 May 2023 04:42) (18 - 19)  SpO2: 92% (26 May 2023 04:42) (92% - 99%)    Parameters below as of 26 May 2023 04:42  Patient On (Oxygen Delivery Method): room air        MEDICATIONS  (STANDING):  ARIPiprazole 5 milliGRAM(s) Oral daily  heparin   Injectable 5000 Unit(s) SubCutaneous every 8 hours  lactated ringers. 1000 milliLiter(s) (125 mL/Hr) IV Continuous <Continuous>  sertraline 100 milliGRAM(s) Oral two times a day  thiamine Injectable 100 milliGRAM(s) IV Push daily    MEDICATIONS  (PRN):  ALPRAZolam 0.5 milliGRAM(s) Oral three times a day PRN for anxiety  LORazepam   Injectable 2 milliGRAM(s) IV Push every 1 hour PRN Symptom-triggered: each CIWA -Ar score 8 or GREATER      Allergies    No Known Allergies    Intolerances        LABS:                        13.9   11.63 )-----------( 237      ( 25 May 2023 16:49 )             38.4     CBC Full  -  ( 25 May 2023 16:49 )  WBC Count : 11.63 K/uL  Hemoglobin : 13.9 g/dL  Hematocrit : 38.4 %  Platelet Count - Automated : 237 K/uL  Mean Cell Volume : 90.1 fl  Mean Cell Hemoglobin : 32.6 pg  Mean Cell Hemoglobin Concentration : 36.2 gm/dL  Auto Neutrophil # : 7.92 K/uL  Auto Lymphocyte # : 2.47 K/uL  Auto Monocyte # : 1.07 K/uL  Auto Eosinophil # : 0.07 K/uL  Auto Basophil # : 0.03 K/uL  Auto Neutrophil % : 68.1 %  Auto Lymphocyte % : 21.2 %  Auto Monocyte % : 9.2 %  Auto Eosinophil % : 0.6 %  Auto Basophil % : 0.3 %    26 May 2023 06:55    138    |  103    |  25     ----------------------------<  106    3.4     |  29     |  0.52     Ca    8.7        26 May 2023 06:55  Phos  2.0       26 May 2023 06:55  Mg     1.6       26 May 2023 06:55    TPro  5.7    /  Alb  2.8    /  TBili  0.3    /  DBili  0.1    /  AST  32     /  ALT  41     /  AlkPhos  83     26 May 2023 06:55      Urinalysis Basic - ( 25 May 2023 20:50 )    Color: Pale Yellow / Appearance: Clear / S.005 / pH: x  Gluc: x / Ketone: Negative  / Bili: Negative / Urobili: Negative   Blood: x / Protein: Negative / Nitrite: Negative   Leuk Esterase: Trace / RBC: 0-2 /HPF / WBC 0-2   Sq Epi: x / Non Sq Epi: x / Bacteria: x      CAPILLARY BLOOD GLUCOSE      POCT Blood Glucose.: 99 mg/dL (26 May 2023 08:06)          RADIOLOGY & ADDITIONAL TESTS:    < from: US Abdomen Upper Quadrant Right (23 @ 12:52) >    ACC: [id number] EXAM:  US ABDOMEN RT UPR QUADRANT   ORDERED BY: RAUL CASTANEDA     PROCEDURE DATE:  2023          INTERPRETATION:  CLINICAL INFORMATION: Pancreatitis.    COMPARISON: Ultrasound abdomen 3/1/2023. CT abdomen pelvis 2023.    TECHNIQUE: Sonography of the right upper quadrant.    FINDINGS:    Liver: Steatotic liver, measuring 15.7 cm craniocaudal dimension. Liver   parenchyma is only partially imaged due to shadowing artifact.  Bile ducts: Normal caliber. Common bile duct measures 3 mm.  Gallbladder: No gallstone, sludge, gallbladder wall thickening, or   pericholecystic fluid. Sonographic Walker sign is negative. Correlate for   recent pain medication administration.  Pancreas: Poorly assessed on ultrasound.  Right kidney: Measures 9.9 cm. No hydronephrosis.  Ascites: None.  Aorta/ IVC: Poorly visualized.    IMPRESSION:    No sonographic evidence of acute cholecystitis. No biliary distention.    Steatosis.    Pancreas is poorly assessed on ultrasound.    --- End of Report ---            CHELSIE BERG M.D., ATTENDING RADIOLOGIST  This document has been electronically signed. May 26 2023  1:17PM    < end of copied text >      Personally reviewed.     Consultant(s) Notes Reviewed:  [x] YES  [ ] NO

## 2023-05-26 NOTE — PROGRESS NOTE ADULT - PROBLEM SELECTOR PLAN 5
Hx of ETOH withdrawal, currently consumes ETOH. Reports last drink was 5/22 as she has not been feeling well  - CIWA protocol, scoring low   - RUPERT negative

## 2023-05-26 NOTE — PROGRESS NOTE ADULT - ASSESSMENT
51yo F PMH ETOH dependence presents to ED for abnormal labs, admit for electrolyte abnormality and severe dehydration, elevated lipase found to have pancreatitis.

## 2023-05-26 NOTE — DISCHARGE NOTE PROVIDER - NSDCCPCAREPLAN_GEN_ALL_CORE_FT
PRINCIPAL DISCHARGE DIAGNOSIS  Diagnosis: Pancreatitis  Assessment and Plan of Treatment: You were found to have pancreatitis based on blood tests and CT scan. You were given IV fluids and your diet was advanced as tolerated. Electrolytes were corrected as necessary. Please follow up with your primary care provider in 1 week.     PRINCIPAL DISCHARGE DIAGNOSIS  Diagnosis: Pancreatitis  Assessment and Plan of Treatment: You were found to have pancreatitis based on blood tests and CT scan. You were given IV fluids and your diet was advanced as tolerated. Electrolytes were corrected as necessary. Please take PLENTY of liquids at home. Please follow up with your primary care provider in 1 week and GI doctor (Dr. Oliveros) in 2 weeks.        SECONDARY DISCHARGE DIAGNOSES  Diagnosis: RONAK (acute kidney injury)  Assessment and Plan of Treatment: You were diagnosed with acute kidney injury on admission. You were given fluids. Please avoid taking NSAIDS (medications such as ibuprofen, Advil, Aleve, naproxen, Motrin, etc.) as those medications can worsen kidney function. You may take Tylenol or acetaminophen, if needed, per recommendations listed on the box.   Please follow up with your PCP  within a week of discharge.     PRINCIPAL DISCHARGE DIAGNOSIS  Diagnosis: Pancreatitis  Assessment and Plan of Treatment: You were found to have pancreatitis based on blood tests and CT scan. You were given IV fluids and your diet was advanced as tolerated. Electrolytes were corrected as necessary. Please take PLENTY of liquids at home. Please follow up with your primary care provider in 1 week and GI doctor (Dr. Oliveros) in 2 weeks.        SECONDARY DISCHARGE DIAGNOSES  Diagnosis: Gastritis  Assessment and Plan of Treatment: You were diagnosed with gastritis which is soreness and swelling (inflammation) of the lining of the stomach. Avoid foods or drinks that make your symptoms worse such as caffeine, chocolate, spicy foods, acidic foods, or alcohol.  Please START taking PANTOPRAZOLE 40MG DAILY BEFORE BREAKFAST. Please follow up with your PCP within 1 wk after discharge.    Diagnosis: RONAK (acute kidney injury)  Assessment and Plan of Treatment: You were diagnosed with acute kidney injury on admission. You were given fluids. Please avoid taking NSAIDS (medications such as ibuprofen, Advil, Aleve, naproxen, Motrin, etc.) as those medications can worsen kidney function. You may take Tylenol or acetaminophen, if needed, per recommendations listed on the box.   Please follow up with your PCP  within a week of discharge.     PRINCIPAL DISCHARGE DIAGNOSIS  Diagnosis: Pancreatitis  Assessment and Plan of Treatment: You were found to have pancreatitis based on blood tests. You were given IV fluids and your diet was advanced as tolerated. Electrolytes were corrected as necessary. Please take PLENTY of liquids at home. Please follow up with your primary care provider in 1 week and GI doctor (Dr. Oliveros) in 2 weeks. If you have recurring nausea/vomiting, severe abdominal pain, please return to ER immediately        SECONDARY DISCHARGE DIAGNOSES  Diagnosis: RONAK (acute kidney injury)  Assessment and Plan of Treatment: You were diagnosed with acute kidney injury on admission. You were given fluids. Please avoid taking NSAIDS (medications such as ibuprofen, Advil, Aleve, naproxen, Motrin, etc.) as those medications can worsen kidney function. You may take Tylenol or acetaminophen, if needed, per recommendations listed on the box.   Please follow up with your PCP  within a week of discharge.    Diagnosis: Gastritis  Assessment and Plan of Treatment: You were diagnosed with gastritis which is soreness and swelling (inflammation) of the lining of the stomach. Avoid foods or drinks that make your symptoms worse such as caffeine, chocolate, spicy foods, acidic foods, or alcohol.  Please START taking PANTOPRAZOLE 40MG DAILY BEFORE BREAKFAST. Please follow up with your PCP within 1 wk after discharge.

## 2023-05-26 NOTE — CASE MANAGEMENT PROGRESS NOTE - NSCMPROGRESSNOTE_GEN_ALL_CORE
Discussed pt in rounds, pt NPO, receiving IVF, plan for abd ultrasound. CM consult noted for stairs, CM met with patient at bedside pt stated that she is independent with ambulating and negotiating stairs.

## 2023-05-26 NOTE — PROGRESS NOTE ADULT - PROBLEM SELECTOR PLAN 8
Lovenox for DVT ppx    Dispo  - No indication for PT eval  - Anticipate dc 5/28, or 5/27 if lipase decreases with improved clinical status

## 2023-05-26 NOTE — PROGRESS NOTE ADULT - PROBLEM SELECTOR PLAN 3
BP 88/56 on admission, s/p 2L NS bolus in ED  - Patient normally hypertensive. HOLD home metoprolol/valsartin in setting of hypotension  - Pt SBP ~100 on fluids, continue to monitor  - Monitor routine hemodynamics

## 2023-05-26 NOTE — DISCHARGE NOTE PROVIDER - NSDCMRMEDTOKEN_GEN_ALL_CORE_FT
Abilify 5 mg oral tablet: 1 tab(s) orally once a day  metoprolol succinate 100 mg oral tablet, extended release: 1 tab(s) orally once a day  sertraline 100 mg oral tablet: 1 orally 2 times a day  valsartan 160 mg oral tablet: 1 tab(s) orally once a day  Xanax 0.5 mg oral tablet: 1 tab(s) orally 3 times a day as needed for  anxiety   Abilify 5 mg oral tablet: 1 tab(s) orally once a day  metoprolol succinate 100 mg oral tablet, extended release: 1 tab(s) orally once a day  pantoprazole 40 mg oral delayed release tablet: 1 tab(s) orally once a day (before a meal)  sertraline 100 mg oral tablet: 1 orally 2 times a day  valsartan 160 mg oral tablet: 1 tab(s) orally once a day  Xanax 0.5 mg oral tablet: 1 tab(s) orally 3 times a day as needed for  anxiety

## 2023-05-26 NOTE — DISCHARGE NOTE PROVIDER - NSDCFUSCHEDAPPT_GEN_ALL_CORE_FT
[facility]  CARDIOLOGY [address] [facility]  Scheduled Appointment: 06/05/2023

## 2023-05-26 NOTE — DISCHARGE NOTE PROVIDER - CARE PROVIDER_API CALL
Manuel Gamez.  [address], [address]  [place] [address]  Phone: [phone]  Fax: [phone]  Established Patient  Follow Up Time: 1 week   [facility]  [address], [address]  [place] [address]  Phone: [phone]  Fax: [phone]  Established Patient  Follow Up Time: 1 week    Sd Oliveros  Gastroenterology  [address] [place] [address]  Phone: [phone]  Fax: [phone]  Follow Up Time:

## 2023-05-27 ENCOUNTER — TRANSCRIPTION ENCOUNTER (OUTPATIENT)
Age: 53
End: 2023-05-27

## 2023-05-27 VITALS — HEART RATE: 75 BPM | SYSTOLIC BLOOD PRESSURE: 141 MMHG | DIASTOLIC BLOOD PRESSURE: 90 MMHG

## 2023-05-27 LAB
ANION GAP SERPL CALC-SCNC: 8 MMOL/L — SIGNIFICANT CHANGE UP (ref 5–17)
BUN SERPL-MCNC: 8 MG/DL — SIGNIFICANT CHANGE UP (ref 7–23)
CALCIUM SERPL-MCNC: 8.5 MG/DL — SIGNIFICANT CHANGE UP (ref 8.5–10.1)
CHLORIDE SERPL-SCNC: 105 MMOL/L — SIGNIFICANT CHANGE UP (ref 96–108)
CO2 SERPL-SCNC: 28 MMOL/L — SIGNIFICANT CHANGE UP (ref 22–31)
CREAT SERPL-MCNC: 0.38 MG/DL — LOW (ref 0.5–1.3)
EGFR: 119 ML/MIN/1.73M2 — SIGNIFICANT CHANGE UP
GLUCOSE SERPL-MCNC: 99 MG/DL — SIGNIFICANT CHANGE UP (ref 70–99)
HCT VFR BLD CALC: 35 % — SIGNIFICANT CHANGE UP (ref 34.5–45)
HGB BLD-MCNC: 12.3 G/DL — SIGNIFICANT CHANGE UP (ref 11.5–15.5)
LIDOCAIN IGE QN: 1677 U/L — HIGH (ref 73–393)
MCHC RBC-ENTMCNC: 32.5 PG — SIGNIFICANT CHANGE UP (ref 27–34)
MCHC RBC-ENTMCNC: 35.1 GM/DL — SIGNIFICANT CHANGE UP (ref 32–36)
MCV RBC AUTO: 92.6 FL — SIGNIFICANT CHANGE UP (ref 80–100)
NRBC # BLD: 0 /100 WBCS — SIGNIFICANT CHANGE UP (ref 0–0)
PLATELET # BLD AUTO: 246 K/UL — SIGNIFICANT CHANGE UP (ref 150–400)
POTASSIUM SERPL-MCNC: 3.5 MMOL/L — SIGNIFICANT CHANGE UP (ref 3.5–5.3)
POTASSIUM SERPL-SCNC: 3.5 MMOL/L — SIGNIFICANT CHANGE UP (ref 3.5–5.3)
RBC # BLD: 3.78 M/UL — LOW (ref 3.8–5.2)
RBC # FLD: 11.7 % — SIGNIFICANT CHANGE UP (ref 10.3–14.5)
SODIUM SERPL-SCNC: 141 MMOL/L — SIGNIFICANT CHANGE UP (ref 135–145)
WBC # BLD: 8.35 K/UL — SIGNIFICANT CHANGE UP (ref 3.8–10.5)
WBC # FLD AUTO: 8.35 K/UL — SIGNIFICANT CHANGE UP (ref 3.8–10.5)

## 2023-05-27 PROCEDURE — 85025 COMPLETE CBC W/AUTO DIFF WBC: CPT

## 2023-05-27 PROCEDURE — 80307 DRUG TEST PRSMV CHEM ANLYZR: CPT

## 2023-05-27 PROCEDURE — 83735 ASSAY OF MAGNESIUM: CPT

## 2023-05-27 PROCEDURE — 80076 HEPATIC FUNCTION PANEL: CPT

## 2023-05-27 PROCEDURE — 85027 COMPLETE CBC AUTOMATED: CPT

## 2023-05-27 PROCEDURE — 87086 URINE CULTURE/COLONY COUNT: CPT

## 2023-05-27 PROCEDURE — 74177 CT ABD & PELVIS W/CONTRAST: CPT

## 2023-05-27 PROCEDURE — 83690 ASSAY OF LIPASE: CPT

## 2023-05-27 PROCEDURE — 96374 THER/PROPH/DIAG INJ IV PUSH: CPT

## 2023-05-27 PROCEDURE — 80053 COMPREHEN METABOLIC PANEL: CPT

## 2023-05-27 PROCEDURE — 99239 HOSP IP/OBS DSCHRG MGMT >30: CPT | Mod: GC

## 2023-05-27 PROCEDURE — 87637 SARSCOV2&INF A&B&RSV AMP PRB: CPT

## 2023-05-27 PROCEDURE — 93005 ELECTROCARDIOGRAM TRACING: CPT

## 2023-05-27 PROCEDURE — 99285 EMERGENCY DEPT VISIT HI MDM: CPT

## 2023-05-27 PROCEDURE — 84100 ASSAY OF PHOSPHORUS: CPT

## 2023-05-27 PROCEDURE — 82962 GLUCOSE BLOOD TEST: CPT

## 2023-05-27 PROCEDURE — 36415 COLL VENOUS BLD VENIPUNCTURE: CPT

## 2023-05-27 PROCEDURE — 84484 ASSAY OF TROPONIN QUANT: CPT

## 2023-05-27 PROCEDURE — 71045 X-RAY EXAM CHEST 1 VIEW: CPT

## 2023-05-27 PROCEDURE — 80048 BASIC METABOLIC PNL TOTAL CA: CPT

## 2023-05-27 PROCEDURE — 81001 URINALYSIS AUTO W/SCOPE: CPT

## 2023-05-27 PROCEDURE — 80061 LIPID PANEL: CPT

## 2023-05-27 PROCEDURE — 82150 ASSAY OF AMYLASE: CPT

## 2023-05-27 PROCEDURE — 76705 ECHO EXAM OF ABDOMEN: CPT

## 2023-05-27 RX ORDER — VALSARTAN 80 MG/1
160 TABLET ORAL DAILY
Refills: 0 | Status: DISCONTINUED | OUTPATIENT
Start: 2023-05-27 | End: 2023-05-27

## 2023-05-27 RX ORDER — POTASSIUM CHLORIDE 20 MEQ
40 PACKET (EA) ORAL ONCE
Refills: 0 | Status: COMPLETED | OUTPATIENT
Start: 2023-05-27 | End: 2023-05-27

## 2023-05-27 RX ORDER — METOPROLOL TARTRATE 50 MG
100 TABLET ORAL DAILY
Refills: 0 | Status: DISCONTINUED | OUTPATIENT
Start: 2023-05-27 | End: 2023-05-27

## 2023-05-27 RX ORDER — ACETAMINOPHEN 500 MG
650 TABLET ORAL ONCE
Refills: 0 | Status: COMPLETED | OUTPATIENT
Start: 2023-05-27 | End: 2023-05-27

## 2023-05-27 RX ORDER — PANTOPRAZOLE SODIUM 20 MG/1
40 TABLET, DELAYED RELEASE ORAL
Refills: 0 | Status: DISCONTINUED | OUTPATIENT
Start: 2023-05-27 | End: 2023-05-27

## 2023-05-27 RX ADMIN — SERTRALINE 100 MILLIGRAM(S): 25 TABLET, FILM COATED ORAL at 06:13

## 2023-05-27 RX ADMIN — Medication 650 MILLIGRAM(S): at 13:03

## 2023-05-27 RX ADMIN — ARIPIPRAZOLE 5 MILLIGRAM(S): 15 TABLET ORAL at 12:03

## 2023-05-27 RX ADMIN — ENOXAPARIN SODIUM 40 MILLIGRAM(S): 100 INJECTION SUBCUTANEOUS at 12:02

## 2023-05-27 RX ADMIN — Medication 650 MILLIGRAM(S): at 12:03

## 2023-05-27 RX ADMIN — Medication 40 MILLIEQUIVALENT(S): at 14:54

## 2023-05-27 RX ADMIN — Medication 650 MILLIGRAM(S): at 06:39

## 2023-05-27 RX ADMIN — PANTOPRAZOLE SODIUM 40 MILLIGRAM(S): 20 TABLET, DELAYED RELEASE ORAL at 12:03

## 2023-05-27 RX ADMIN — Medication 100 MILLIGRAM(S): at 14:54

## 2023-05-27 RX ADMIN — VALSARTAN 160 MILLIGRAM(S): 80 TABLET ORAL at 14:54

## 2023-05-27 RX ADMIN — Medication 650 MILLIGRAM(S): at 07:39

## 2023-05-27 RX ADMIN — Medication 100 MILLIGRAM(S): at 12:02

## 2023-05-27 RX ADMIN — SODIUM CHLORIDE 125 MILLILITER(S): 9 INJECTION, SOLUTION INTRAVENOUS at 08:02

## 2023-05-27 RX ADMIN — Medication 0.5 MILLIGRAM(S): at 08:31

## 2023-05-27 NOTE — CHART NOTE - NSCHARTNOTEFT_GEN_A_CORE
Called by RN, pt eating food brought in by family although patient supposed to be on clear liquid diet. Per RN, patient was given Chinese food, peanut butter sandwiches and gummy bears. Patient aware supposed to be on clear liquid diet. Tolerating PO.   - Will continue to monitor, RN to call if any changes.
Called by RN, pt NPO except meds. Patient's family member brought food to patient and patient ate entirety of bag. Patient without nausea or vomiting at this time. Tolerated PO.   - Will continue to monitor, RN to call if any changes.

## 2023-05-27 NOTE — PROGRESS NOTE ADULT - SUBJECTIVE AND OBJECTIVE BOX
Patient seen and examined at bedside. States she is feeling well, complains of feeling hungry, denies any chest pain, SOB, abd pain. Eager to go home. Patient noted to be eating regular food overnight. Lipase downtrending, advanced to regular diet, tolerating well. Patient to be discharged home with close follow up with PCP and GI. Patient agreeable with d/c plan.    Physical Exam:  GENERAL:  Not in acute distress, lying in bed comfortably  HEENT:  PERRLA, NCAT  CHEST:  CTA B/L, no wheezes   HEART:  Regular rate and rhythm, no murmurs, rubs, gallops  ABDOMEN:  Soft, non-tender, non-distended  EXTREMITIES:  no cyanosis, no LE edema  SKIN:  Warm, well perfused  NEURO:  Alert, Conversing appropriately, A&Ox3  PSYCH: Not emotionally labile, appropriate affect     Please refer to updated D/C note for further details.

## 2023-05-27 NOTE — DISCHARGE NOTE NURSING/CASE MANAGEMENT/SOCIAL WORK - NSDCPEEMAIL_GEN_ALL_CORE
[facility] for Tobacco Control email [email or web address]

## 2023-05-27 NOTE — DISCHARGE NOTE NURSING/CASE MANAGEMENT/SOCIAL WORK - NSDCPEWEB_GEN_ALL_CORE
[facility] for Tobacco Control website --- [email or web address] website --- [email or web address]

## 2023-05-27 NOTE — DISCHARGE NOTE NURSING/CASE MANAGEMENT/SOCIAL WORK - PATIENT PORTAL LINK FT
You can access the FollowMyHealth Patient Portal offered by [facility] by registering at the following website: [email or web address]. By joining [employer]’s FollowMyHealth portal, you will also be able to view your health information using other applications (apps) compatible with our system.

## 2023-05-27 NOTE — CASE MANAGEMENT PROGRESS NOTE - NSCMPROGRESSNOTE_GEN_ALL_CORE
CM met with patient at bedside to discuss that per MD, she is stable for transition to home today. PT is in agreement with transitioning to home today, per patient a friend will be transporting her home.

## 2023-05-28 RX ORDER — PANTOPRAZOLE SODIUM 20 MG/1
1 TABLET, DELAYED RELEASE ORAL
Qty: 30 | Refills: 0
Start: 2023-05-28 | End: 2023-06-26

## 2023-06-05 ENCOUNTER — APPOINTMENT (OUTPATIENT)
Dept: CARDIOLOGY | Facility: CLINIC | Age: 53
End: 2023-06-05

## 2023-06-05 NOTE — HISTORY OF PRESENT ILLNESS
[FreeTextEntry1] : Felicia presented to the office today for a cardiovascular evaluation.  I saw her in the office about 3 months ago for the further evaluation of syncope and chest discomfort.\par \par She is now 52 years old, with a history of hypertension.  She does not have diabetes.  In the past, she was on cholesterol medication, but reportedly her cholesterol improved and she is no longer taking medication for that.  She has a history of smoking, at times up to 2 packs/day, presently one half a pack a day.  She is not known to have any underlying structural heart disease.  She does have a history of breast cancer status postresection, following which she had chemotherapy and 5 years of tamoxifen.  There is a family history of aggressive atherosclerosis, noting that her father had a myocardial infarction at the age of 42, with 2 subsequent open heart surgeries.  Her brother had a myocardial infarction at a young age, and presently has a defibrillator.\par \par She has been in her usual state of health generally speaking until recently.  She had significant nausea vomiting and dehydration given some confusion with her medications.  She says that she had minimal p.o. intake for about 1 week.  In that context, she reports that on a single day she got up out of bed or from a chair several times, felt lightheaded, and fell to the floor with brief losses of consciousness.  These were not preceded by any sensation of palpitations, and there was no prolonged postictal state.  It sounds like this was all in the context of her having stopped her Xanax, with some associated withdrawal symptoms.  She was recently evaluated by her primary care physician, and given concerns about her overall risk factors and these episodes of syncope, she was referred here for evaluation. \par \par I saw her in the office March 9, 2023.  I felt her symptoms were likely on the basis of orthostatic hypotension in the setting of dehydration.  I recommended an echocardiogram, which was not performed.  I recommended that she start checking her blood pressure at home.  I recommended a blood pressure check in the office.  She did not show up.\par \par She was admitted to the hospital between May 25 and May 27, 2023 in the setting of weakness and an electrolyte abnormality.  She was noted as an outpatient to have leukocytosis and hypokalemia, as well as hypotension in the setting of pancreatitis.  She was admitted.  Her electrolytes were repleted.  She was treated for pancreatitis.  \par \par At baseline, she does not exercise, but tries to stay physically active.  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  She does report chest discomfort which seems to be quite brief, and without definite connection to physical activity or emotional stress.  She describes an occasional left-sided chest discomfort, and an occasional midsternal discomfort without reproducible radiation or associated symptoms.  This is a very brief discomfort, lasting moments.  She denies orthopnea, PND and lower extremity edema.  She denies palpitations, dizziness and prior episodes of syncope.

## 2023-06-14 NOTE — DISCHARGE NOTE PROVIDER - DISCHARGE DIET
The skin of the right groin was prepped and draped in the usual sterile manner but not clipped. (If not otherwise specified, skin prep was bilateral.) Regular Diet - No restrictions

## 2023-07-25 NOTE — ED PROVIDER NOTE - PRO INTERPRETER NEED 2
English Cimzia Pregnancy And Lactation Text: This medication crosses the placenta but can be considered safe in certain situations. Cimzia may be excreted in breast milk.

## 2025-05-23 NOTE — ED ADULT NURSE NOTE - NS_SISCREENINGSR_GEN_ALL_ED
Negative Detail Level: Generalized Text: The above diagnosis and findings were noted on the exam but not discussed at this time with the patient.